# Patient Record
Sex: FEMALE | Race: WHITE | Employment: PART TIME | URBAN - METROPOLITAN AREA
[De-identification: names, ages, dates, MRNs, and addresses within clinical notes are randomized per-mention and may not be internally consistent; named-entity substitution may affect disease eponyms.]

---

## 2017-11-13 ENCOUNTER — OFFICE VISIT (OUTPATIENT)
Dept: INTERNAL MEDICINE CLINIC | Age: 47
End: 2017-11-13

## 2017-11-13 VITALS
WEIGHT: 133.8 LBS | BODY MASS INDEX: 22.84 KG/M2 | HEART RATE: 70 BPM | SYSTOLIC BLOOD PRESSURE: 120 MMHG | TEMPERATURE: 98.6 F | HEIGHT: 64 IN | DIASTOLIC BLOOD PRESSURE: 74 MMHG | RESPIRATION RATE: 12 BRPM

## 2017-11-13 DIAGNOSIS — R00.2 HEART PALPITATIONS: ICD-10-CM

## 2017-11-13 DIAGNOSIS — Z00.00 PREVENTATIVE HEALTH CARE: Primary | ICD-10-CM

## 2017-11-13 DIAGNOSIS — R94.31 SHORTENED PR INTERVAL: ICD-10-CM

## 2017-11-13 NOTE — PROGRESS NOTES
Presents for a PE. She is fasting. In the summer, was sitting down and started blacking out. Three days later with vertigo. Nothing since then. Feels little flutters in her heart over the last month.

## 2017-11-13 NOTE — MR AVS SNAPSHOT
Visit Information Date & Time Provider Department Dept. Phone Encounter #  
 11/13/2017  9:00 AM Alysia Tarango MD [facility] [address] [id number] Upcoming Health Maintenance Date Due  
 PAP AKA CERVICAL CYTOLOGY 12/1/2018 DTaP/Tdap/Td series (2 - Td) 9/21/2026 Allergies as of 11/13/2017  Review Complete On: 11/13/2017 By: Alysia Tarango MD  
  
 Severity Noted Reaction Type Reactions Vicodin [Hydrocodone-acetaminophen]  03/16/2016    Other (comments) Current Immunizations  Reviewed on 11/13/2017 Name Date Influenza Vaccine 9/29/2017, 10/1/2015 Tdap 9/21/2016 Reviewed by Bassam Tuttle on 11/13/2017 at  9:10 AM  
 Reviewed by Alysia Tarango MD on 11/13/2017 at  9:28 AM  
 Reviewed by Alysia Tarango MD on 11/13/2017 at  9:42 AM  
You Were Diagnosed With   
  
 Codes Comments Preventative health care    -  Primary ICD-10-CM: Z00.00 ICD-9-CM: V70.0 Heart palpitations     ICD-10-CM: R00.2 ICD-9-CM: 785.1 Vitals BP Pulse Temp Resp Height(growth percentile) Weight(growth percentile) 120/74 (BP 1 Location: Left arm, BP Patient Position: Sitting) 70 98.6 °F (37 °C) (Oral) 12 5' 3.75\" (1.619 m) 133 lb 12.8 oz (60.7 kg) BMI OB Status Smoking Status 23.15 kg/m2 Having regular periods Never Smoker Vitals History BMI and BSA Data Body Mass Index Body Surface Area  
 23.15 kg/m 2 1.65 m 2 Preferred Pharmacy Pharmacy Name Phone [id number] [place], [address] [place] [address]) [phone] Your Updated Medication List  
  
   
This list is accurate as of: 11/13/17  9:48 AM.  Always use your most recent med list.  
  
  
  
  
 CALCIUM 600 PO Take  by mouth. CRANBERRY PO Take  by mouth. LO LOESTRIN FE 1 mg-10 mcg (24)/10 mcg (2) Tab Generic drug:  norethindrone-e.estradiol-iron  VITAMIN B-12 PO  
 Take  by mouth. We Performed the Following AMB POC EKG ROUTINE W/ 12 LEADS, INTER & REP [33069 CPT(R)] CBC W/O DIFF [93344 CPT(R)] LIPID PANEL [54850 CPT(R)] MAGNESIUM [id number] CPT(R)] METABOLIC PANEL, COMPREHENSIVE [85270 CPT(R)] TSH 3RD GENERATION [10917 CPT(R)] Patient Instructions Well Visit, Ages 25 to 48: Care Instructions Your Care Instructions Physical exams can help you stay healthy. Your doctor has checked your overall health and may have suggested ways to take good care of yourself. He or she also may have recommended tests. At home, you can help prevent illness with healthy eating, regular exercise, and other steps. Follow-up care is a key part of your treatment and safety. Be sure to make and go to all appointments, and call your doctor if you are having problems. It's also a good idea to know your test results and keep a list of the medicines you take. How can you care for yourself at home? · Reach and stay at a healthy weight. This will lower your risk for many problems, such as obesity, diabetes, heart disease, and high blood pressure. · Get at least 30 minutes of physical activity on most days of the week. Walking is a good choice. You also may want to do other activities, such as running, swimming, cycling, or playing tennis or team sports. Discuss any changes in your exercise program with your doctor. · Do not smoke or allow others to smoke around you. If you need help quitting, talk to your doctor about stop-smoking programs and medicines. These can increase your chances of quitting for good. · Talk to your doctor about whether you have any risk factors for sexually transmitted infections (STIs). Having one sex partner (who does not have STIs and does not have sex with anyone else) is a good way to avoid these infections. · Use birth control if you do not want to have children at this time.  Talk with your doctor about the choices available and what might be best for you. · Protect your skin from too much sun. When you're outdoors from 10 a.m. to 4 p.m., stay in the shade or cover up with clothing and a hat with a wide brim. Wear sunglasses that block UV rays. Even when it's cloudy, put broad-spectrum sunscreen (SPF 30 or higher) on any exposed skin. · See a dentist one or two times a year for checkups and to have your teeth cleaned. · Wear a seat belt in the car. · Drink alcohol in moderation, if at all. That means no more than 2 drinks a day for men and 1 drink a day for women. Follow your doctor's advice about when to have certain tests. These tests can spot problems early. For everyone · Cholesterol. Have the fat (cholesterol) in your blood tested after age 21. Your doctor will tell you how often to have this done based on your age, family history, or other things that can increase your risk for heart disease. · Blood pressure. Have your blood pressure checked during a routine doctor visit. Your doctor will tell you how often to check your blood pressure based on your age, your blood pressure results, and other factors. · Vision. Talk with your doctor about how often to have a glaucoma test. 
· Diabetes. Ask your doctor whether you should have tests for diabetes. · Colon cancer. Have a test for colon cancer at age 48. You may have one of several tests. If you are younger than 48, you may need a test earlier if you have any risk factors. Risk factors include whether you already had a precancerous polyp removed from your colon or whether your parent, brother, sister, or child has had colon cancer. For women · Breast exam and mammogram. Talk to your doctor about when you should have a clinical breast exam and a mammogram. Medical experts differ on whether and how often women under 50 should have these tests. Your doctor can help you decide what is right for you. · Pap test and pelvic exam. Begin Pap tests at age 24. A Pap test is the best way to find cervical cancer. The test often is part of a pelvic exam. Ask how often to have this test. 
· Tests for sexually transmitted infections (STIs). Ask whether you should have tests for STIs. You may be at risk if you have sex with more than one person, especially if your partners do not wear condoms. For men · Tests for sexually transmitted infections (STIs). Ask whether you should have tests for STIs. You may be at risk if you have sex with more than one person, especially if you do not wear a condom. · Testicular cancer exam. Ask your doctor whether you should check your testicles regularly. · Prostate exam. Talk to your doctor about whether you should have a blood test (called a PSA test) for prostate cancer. Experts differ on whether and when men should have this test. Some experts suggest it if you are older than 39 and are -American or have a father or brother who got prostate cancer when he was younger than 72. When should you call for help? Watch closely for changes in your health, and be sure to contact your doctor if you have any problems or symptoms that concern you. Where can you learn more? Go to http://rodger-adriana.info/. Enter P072 in the search box to learn more about \"Well Visit, Ages 25 to 48: Care Instructions. \" Current as of: May 12, 2017 Content Version: 11.4 © 6820-4167 [employer]. Care instructions adapted under license by [employer] (which disclaims liability or warranty for this information). If you have questions about a medical condition or this instruction, always ask your healthcare professional. [place] [address] any warranty or liability for your use of this information. Palpitations: Care Instructions Your Care Instructions Heart palpitations are the uncomfortable sensation that your heart is beating fast or irregularly. You might feel pounding or fluttering in your chest. It might feel like your heart is skipping a beat. Although palpitations may be caused by a heart problem, they also occur because of stress, fatigue, or use of alcohol, caffeine, or nicotine. Many medicines, including diet pills, antihistamines, decongestants, and some herbal products, can cause heart palpitations. Nearly everyone has palpitations from time to time. Depending on your symptoms, your doctor may need to do more tests to try to find the cause of your palpitations. Follow-up care is a key part of your treatment and safety. Be sure to make and go to all appointments, and call your doctor if you are having problems. It's also a good idea to know your test results and keep a list of the medicines you take. How can you care for yourself at home? · Avoid caffeine, nicotine, and excess alcohol. · Do not take illegal drugs, such as methamphetamines and cocaine. · Do not take weight loss or diet medicines unless you talk with your doctor first. 
· Get plenty of sleep. · Do not overeat. · If you have palpitations again, take deep breaths and try to relax. This may slow a racing heart. · If you start to feel lightheaded, lie down to avoid injuries that might result if you pass out and fall down. · Keep a record of your palpitations and bring it to your next doctor's appointment. Write down: ¨ The date and time. ¨ Your pulse. (If your heart is beating fast, it may be hard to count your pulse.) ¨ What you were doing when the palpitations started. ¨ How long the palpitations lasted. ¨ Any other symptoms. · If an activity causes palpitations, slow down or stop. Talk to your doctor before you do that activity again. · Take your medicines exactly as prescribed. Call your doctor if you think you are having a problem with your medicine. When should you call for help? Call 911 anytime you think you may need emergency care. For example, call if: 
? · You passed out (lost consciousness). ? · You have symptoms of a heart attack. These may include: ¨ Chest pain or pressure, or a strange feeling in the chest. 
¨ Sweating. ¨ Shortness of breath. ¨ Pain, pressure, or a strange feeling in the back, neck, jaw, or upper belly or in one or both shoulders or arms. ¨ Lightheadedness or sudden weakness. ¨ A fast or irregular heartbeat. After you call 911, the  may tell you to chew 1 adult-strength or 2 to 4 low-dose aspirin. Wait for an ambulance. Do not try to drive yourself. ? · You have symptoms of a stroke. These may include: 
¨ Sudden numbness, tingling, weakness, or loss of movement in your face, arm, or leg, especially on only one side of your body. ¨ Sudden vision changes. ¨ Sudden trouble speaking. ¨ Sudden confusion or trouble understanding simple statements. ¨ Sudden problems with walking or balance. ¨ A sudden, severe headache that is different from past headaches. ?Call your doctor now or seek immediate medical care if: 
? · You have heart palpitations and: ¨ Are dizzy or lightheaded, or you feel like you may faint. ¨ Have new or increased shortness of breath. ? Watch closely for changes in your health, and be sure to contact your doctor if: 
? · You continue to have heart palpitations. Where can you learn more? Go to http://rodger-adriana.info/. Enter R508 in the search box to learn more about \"Palpitations: Care Instructions. \" Current as of: September 21, 2016 Content Version: 11.4 © 9759-4032 [employer]. Care instructions adapted under license by [employer] (which disclaims liability or warranty for this information).  If you have questions about a medical condition or this instruction, always ask your healthcare professional. [employer] disclaims any warranty or liability for your use of this information. Introducing [facility]! Soy Arteaga introduces Naverus patient portal. Now you can access parts of your medical record, email your doctor's office, and request medication refills online. 1. In your internet browser, go to [email or web address] 2. Click on the First Time User? Click Here link in the Sign In box. You will see the New Member Sign Up page. 3. Enter your Naverus Access Code exactly as it appears below. You will not need to use this code after youve completed the sign-up process. If you do not sign up before the expiration date, you must request a new code. · Naverus Access Code: [email or web address] Expires: 2/11/2018  9:48 AM 
 
4. Enter the last four digits of your Social Security Number (xxxx) and Date of Birth (mm/dd/yyyy) as indicated and click Submit. You will be taken to the next sign-up page. 5. Create a Naverus ID. This will be your Naverus login ID and cannot be changed, so think of one that is secure and easy to remember. 6. Create a Naverus password. You can change your password at any time. 7. Enter your Password Reset Question and Answer. This can be used at a later time if you forget your password. 8. Enter your e-mail address. You will receive e-mail notification when new information is available in [address] 9. Click Sign Up. You can now view and download portions of your medical record. 10. Click the Download Summary menu link to download a portable copy of your medical information. If you have questions, please visit the Frequently Asked Questions section of the Naverus website. Remember, Naverus is NOT to be used for urgent needs. For medical emergencies, dial 911. Now available from your iPhone and Android! Please provide this summary of care documentation to your next provider. Your primary care clinician is listed as Yancy Belle. If you have any questions after today's visit, please call [phone].

## 2017-11-13 NOTE — PROGRESS NOTES
Yovani Nicolas is a 41 y.o. female  Presenting for her annual checkup and follow-up  Presents for a PE. She is fasting. In the summer, was sitting down and started blacking out while at the beach. Three days later, she felt vertigo (past hx of this)   This also occurred in . She eats regularly. Feels little flutters in her heart over the last month. Lasts a few seconds and resolves Denies chest pain, SOB, dizziness. Father w hx of heart attack, having an ablation. She walks on treadmill without s/s. Last breast exam: per GYN  Last PAP/pelvic: UTD per GYN. Per Dr. Renee Clark  Last colonoscopy: none  Last DEXA:   Health Maintenance   Topic Date Due    PAP AKA CERVICAL CYTOLOGY  2018    DTaP/Tdap/Td series (2 - Td) 2026    Influenza Age 9 to Adult  Completed       Exercise: moderately active  Diet: generally follows a low fat low cholesterol diet    Vaccinations reviewed  Immunization History   Administered Date(s) Administered    Influenza Vaccine 10/01/2015, 2017    Tdap 2016       Allergies: Vicodin [hydrocodone-acetaminophen]  Current Outpatient Prescriptions   Medication Sig    CALCIUM CARBONATE (CALCIUM 600 PO) Take  by mouth.  CYANOCOBALAMIN, VITAMIN B-12, (VITAMIN B-12 PO) Take  by mouth.  CRANBERRY FRUIT EXTRACT (CRANBERRY PO) Take  by mouth.  LO LOESTRIN FE 1 mg-10 mcg (24)/10 mcg (2) tab      No current facility-administered medications for this visit. has a past medical history of Atypical nevus; Eustachian tube dysfunction; House dust mite allergy (); Nephrolithiasis (); Pap smear for cervical cancer screening (2015); Scoliosis; and Vertigo (2016).   Past Surgical History:   Procedure Laterality Date    HX  SECTION      HX TONSILLECTOMY        Social History     Social History    Marital status:      Spouse name: Alondra Pruitt Number of children: 1+5    Years of education: N/A     Occupational History     [address] [employer]     Social History Main Topics    Smoking status: Never Smoker    Smokeless tobacco: Never Used    Alcohol use Yes      Comment: one drink per week    Drug use: Not on file    Sexual activity: Yes     Other Topics Concern    Not on file     Social History Narrative     Family History   Problem Relation Age of Onset    Cancer Maternal Grandmother 67     gallbladder?  Heart Attack Maternal Grandfather 76    Prostate Cancer Paternal Grandfather     Diabetes Neg Hx     Hypertension Neg Hx     High Cholesterol Neg Hx        Review of Systems - History obtained from the patient  General ROS: negative for - night sweats, weight gain or weight loss  Cardiovascular ROS: no chest pain, dyspnea on exertion, edema  GYN ROS: normal menses, no abnormal bleeding, pelvic pain or discharge, no breast pain or new or enlarging lumps on self exam.    Physical exam  Blood pressure 120/74, pulse 70, temperature 98.6 °F (37 °C), temperature source Oral, resp. rate 12, height 5' 3.75\" (1.619 m), weight 133 lb 12.8 oz (60.7 kg). Wt Readings from Last 3 Encounters:   11/13/17 133 lb 12.8 oz (60.7 kg)   09/21/16 128 lb (58.1 kg)   03/16/16 130 lb (59 kg)     she appears well, alert and oriented x 3, pleasant and cooperative. Vitals as noted. No rashes or significant lesions. Neck supple and free of adenopathy, or masses. No thyromegaly or carotid bruits. Cranial nerves normal. Lungs are clear to auscultation. Heart sounds are normal with no murmurs, clicks, gallops or rubs. Abdomen is soft, non- tender, with no masses or organomegaly. Extremities, peripheral pulses and reflexes are normal.  .   Seeing Dr. HERNANDEZ for GYN    Diagnoses and all orders for this visit:    1. Preventative health care  -     LIPID PANEL  -     METABOLIC PANEL, COMPREHENSIVE  -     CBC W/O DIFF    2. Shortened MT interval  3. Heart palpitations   Intermittent palpitations recently.   History of presyncope 4 months ago without recurrence. EKG today shows short MO interval.  Will check electrolytes and thyroid function. Recommend consultation with electrophysiology if symptoms worsen.    -     TSH 3RD GENERATION  -     MAGNESIUM  -     AMB POC EKG ROUTINE W/ 12 LEADS, INTER & REP    The patient is asked to make an attempt to improve diet and exercise patterns    Return for yearly wellness visits

## 2017-11-13 NOTE — PATIENT INSTRUCTIONS
Well Visit, Ages 25 to 48: Care Instructions  Your Care Instructions    Physical exams can help you stay healthy. Your doctor has checked your overall health and may have suggested ways to take good care of yourself. He or she also may have recommended tests. At home, you can help prevent illness with healthy eating, regular exercise, and other steps. Follow-up care is a key part of your treatment and safety. Be sure to make and go to all appointments, and call your doctor if you are having problems. It's also a good idea to know your test results and keep a list of the medicines you take. How can you care for yourself at home? · Reach and stay at a healthy weight. This will lower your risk for many problems, such as obesity, diabetes, heart disease, and high blood pressure. · Get at least 30 minutes of physical activity on most days of the week. Walking is a good choice. You also may want to do other activities, such as running, swimming, cycling, or playing tennis or team sports. Discuss any changes in your exercise program with your doctor. · Do not smoke or allow others to smoke around you. If you need help quitting, talk to your doctor about stop-smoking programs and medicines. These can increase your chances of quitting for good. · Talk to your doctor about whether you have any risk factors for sexually transmitted infections (STIs). Having one sex partner (who does not have STIs and does not have sex with anyone else) is a good way to avoid these infections. · Use birth control if you do not want to have children at this time. Talk with your doctor about the choices available and what might be best for you. · Protect your skin from too much sun. When you're outdoors from 10 a.m. to 4 p.m., stay in the shade or cover up with clothing and a hat with a wide brim. Wear sunglasses that block UV rays. Even when it's cloudy, put broad-spectrum sunscreen (SPF 30 or higher) on any exposed skin.   · See a dentist one or two times a year for checkups and to have your teeth cleaned. · Wear a seat belt in the car. · Drink alcohol in moderation, if at all. That means no more than 2 drinks a day for men and 1 drink a day for women. Follow your doctor's advice about when to have certain tests. These tests can spot problems early. For everyone  · Cholesterol. Have the fat (cholesterol) in your blood tested after age 21. Your doctor will tell you how often to have this done based on your age, family history, or other things that can increase your risk for heart disease. · Blood pressure. Have your blood pressure checked during a routine doctor visit. Your doctor will tell you how often to check your blood pressure based on your age, your blood pressure results, and other factors. · Vision. Talk with your doctor about how often to have a glaucoma test.  · Diabetes. Ask your doctor whether you should have tests for diabetes. · Colon cancer. Have a test for colon cancer at age 48. You may have one of several tests. If you are younger than 48, you may need a test earlier if you have any risk factors. Risk factors include whether you already had a precancerous polyp removed from your colon or whether your parent, brother, sister, or child has had colon cancer. For women  · Breast exam and mammogram. Talk to your doctor about when you should have a clinical breast exam and a mammogram. Medical experts differ on whether and how often women under 50 should have these tests. Your doctor can help you decide what is right for you. · Pap test and pelvic exam. Begin Pap tests at age 24. A Pap test is the best way to find cervical cancer. The test often is part of a pelvic exam. Ask how often to have this test.  · Tests for sexually transmitted infections (STIs). Ask whether you should have tests for STIs. You may be at risk if you have sex with more than one person, especially if your partners do not wear condoms.   For men  · Tests for sexually transmitted infections (STIs). Ask whether you should have tests for STIs. You may be at risk if you have sex with more than one person, especially if you do not wear a condom. · Testicular cancer exam. Ask your doctor whether you should check your testicles regularly. · Prostate exam. Talk to your doctor about whether you should have a blood test (called a PSA test) for prostate cancer. Experts differ on whether and when men should have this test. Some experts suggest it if you are older than 39 and are -American or have a father or brother who got prostate cancer when he was younger than 72. When should you call for help? Watch closely for changes in your health, and be sure to contact your doctor if you have any problems or symptoms that concern you. Where can you learn more? Go to http://rodger-adriana.info/. Enter P072 in the search box to learn more about \"Well Visit, Ages 25 to 48: Care Instructions. \"  Current as of: May 12, 2017  Content Version: 11.4  © 0821-6536 [employer]. Care instructions adapted under license by [employer] (which disclaims liability or warranty for this information). If you have questions about a medical condition or this instruction, always ask your healthcare professional. [place] [address] any warranty or liability for your use of this information. Palpitations: Care Instructions  Your Care Instructions    Heart palpitations are the uncomfortable sensation that your heart is beating fast or irregularly. You might feel pounding or fluttering in your chest. It might feel like your heart is skipping a beat. Although palpitations may be caused by a heart problem, they also occur because of stress, fatigue, or use of alcohol, caffeine, or nicotine. Many medicines, including diet pills, antihistamines, decongestants, and some herbal products, can cause heart palpitations.  Nearly everyone has palpitations from time to time. Depending on your symptoms, your doctor may need to do more tests to try to find the cause of your palpitations. Follow-up care is a key part of your treatment and safety. Be sure to make and go to all appointments, and call your doctor if you are having problems. It's also a good idea to know your test results and keep a list of the medicines you take. How can you care for yourself at home? · Avoid caffeine, nicotine, and excess alcohol. · Do not take illegal drugs, such as methamphetamines and cocaine. · Do not take weight loss or diet medicines unless you talk with your doctor first.  · Get plenty of sleep. · Do not overeat. · If you have palpitations again, take deep breaths and try to relax. This may slow a racing heart. · If you start to feel lightheaded, lie down to avoid injuries that might result if you pass out and fall down. · Keep a record of your palpitations and bring it to your next doctor's appointment. Write down:  ¨ The date and time. ¨ Your pulse. (If your heart is beating fast, it may be hard to count your pulse.)  ¨ What you were doing when the palpitations started. ¨ How long the palpitations lasted. ¨ Any other symptoms. · If an activity causes palpitations, slow down or stop. Talk to your doctor before you do that activity again. · Take your medicines exactly as prescribed. Call your doctor if you think you are having a problem with your medicine. When should you call for help? Call 911 anytime you think you may need emergency care. For example, call if:  ? · You passed out (lost consciousness). ? · You have symptoms of a heart attack. These may include:  ¨ Chest pain or pressure, or a strange feeling in the chest.  ¨ Sweating. ¨ Shortness of breath. ¨ Pain, pressure, or a strange feeling in the back, neck, jaw, or upper belly or in one or both shoulders or arms. ¨ Lightheadedness or sudden weakness.   ¨ A fast or irregular heartbeat. After you call 911, the  may tell you to chew 1 adult-strength or 2 to 4 low-dose aspirin. Wait for an ambulance. Do not try to drive yourself. ? · You have symptoms of a stroke. These may include:  ¨ Sudden numbness, tingling, weakness, or loss of movement in your face, arm, or leg, especially on only one side of your body. ¨ Sudden vision changes. ¨ Sudden trouble speaking. ¨ Sudden confusion or trouble understanding simple statements. ¨ Sudden problems with walking or balance. ¨ A sudden, severe headache that is different from past headaches. ?Call your doctor now or seek immediate medical care if:  ? · You have heart palpitations and:  ¨ Are dizzy or lightheaded, or you feel like you may faint. ¨ Have new or increased shortness of breath. ? Watch closely for changes in your health, and be sure to contact your doctor if:  ? · You continue to have heart palpitations. Where can you learn more? Go to http://rodger-adriana.info/. Enter R508 in the search box to learn more about \"Palpitations: Care Instructions. \"  Current as of: September 21, 2016  Content Version: 11.4  © 5647-8945 [employer]. Care instructions adapted under license by [employer] (which disclaims liability or warranty for this information). If you have questions about a medical condition or this instruction, always ask your healthcare professional. [place] [address] any warranty or liability for your use of this information.

## 2017-11-14 LAB
ALBUMIN SERPL-MCNC: 4.6 G/DL (ref 3.5–5.5)
ALBUMIN/GLOB SERPL: 1.6 {RATIO} (ref 1.2–2.2)
ALP SERPL-CCNC: 68 IU/L (ref 39–117)
ALT SERPL-CCNC: 12 IU/L (ref 0–32)
AST SERPL-CCNC: 16 IU/L (ref 0–40)
BILIRUB SERPL-MCNC: 0.4 MG/DL (ref 0–1.2)
BUN SERPL-MCNC: 14 MG/DL (ref 6–24)
BUN/CREAT SERPL: 16 (ref 9–23)
CALCIUM SERPL-MCNC: 9.8 MG/DL (ref 8.7–10.2)
CHLORIDE SERPL-SCNC: 101 MMOL/L (ref 96–106)
CHOLEST SERPL-MCNC: 226 MG/DL (ref 100–199)
CO2 SERPL-SCNC: 22 MMOL/L (ref 18–29)
CREAT SERPL-MCNC: 0.85 MG/DL (ref 0.57–1)
ERYTHROCYTE [DISTWIDTH] IN BLOOD BY AUTOMATED COUNT: 12.9 % (ref 12.3–15.4)
GFR SERPLBLD CREATININE-BSD FMLA CKD-EPI: 83 ML/MIN/1.73
GFR SERPLBLD CREATININE-BSD FMLA CKD-EPI: 96 ML/MIN/1.73
GLOBULIN SER CALC-MCNC: 2.8 G/DL (ref 1.5–4.5)
GLUCOSE SERPL-MCNC: 89 MG/DL (ref 65–99)
HCT VFR BLD AUTO: 42.1 % (ref 34–46.6)
HDLC SERPL-MCNC: 65 MG/DL
HGB BLD-MCNC: 14.1 G/DL (ref 11.1–15.9)
INTERPRETATION, 910389: NORMAL
LDLC SERPL CALC-MCNC: 136 MG/DL (ref 0–99)
MAGNESIUM SERPL-MCNC: 2.2 MG/DL (ref 1.6–2.3)
MCH RBC QN AUTO: 31.5 PG (ref 26.6–33)
MCHC RBC AUTO-ENTMCNC: 33.5 G/DL (ref 31.5–35.7)
MCV RBC AUTO: 94 FL (ref 79–97)
PLATELET # BLD AUTO: 224 X10E3/UL (ref 150–379)
POTASSIUM SERPL-SCNC: 4.4 MMOL/L (ref 3.5–5.2)
PROT SERPL-MCNC: 7.4 G/DL (ref 6–8.5)
RBC # BLD AUTO: 4.47 X10E6/UL (ref 3.77–5.28)
SODIUM SERPL-SCNC: 141 MMOL/L (ref 134–144)
TRIGL SERPL-MCNC: 126 MG/DL (ref 0–149)
TSH SERPL DL<=0.005 MIU/L-ACNC: 3.83 UIU/ML (ref 0.45–4.5)
VLDLC SERPL CALC-MCNC: 25 MG/DL (ref 5–40)
WBC # BLD AUTO: 9.1 X10E3/UL (ref 3.4–10.8)

## 2018-10-02 ENCOUNTER — HOSPITAL ENCOUNTER (OUTPATIENT)
Dept: MAMMOGRAPHY | Age: 48
Discharge: HOME | End: 2018-10-02
Attending: OBSTETRICS & GYNECOLOGY
Payer: COMMERCIAL

## 2018-10-02 DIAGNOSIS — Z12.31 VISIT FOR SCREENING MAMMOGRAM: ICD-10-CM

## 2018-10-02 PROCEDURE — 77067 SCR MAMMO BI INCL CAD: CPT

## 2018-10-04 ENCOUNTER — HOSPITAL ENCOUNTER (OUTPATIENT)
Dept: MAMMOGRAPHY | Age: 48
Discharge: HOME | End: 2018-10-04
Attending: OBSTETRICS & GYNECOLOGY
Payer: COMMERCIAL

## 2018-10-04 DIAGNOSIS — R92.8 ABNORMAL MAMMOGRAM: ICD-10-CM

## 2018-10-04 PROCEDURE — 77065 DX MAMMO INCL CAD UNI: CPT

## 2018-12-24 ENCOUNTER — HOSPITAL ENCOUNTER (OUTPATIENT)
Dept: CT IMAGING | Age: 48
Discharge: HOME | End: 2018-12-24
Attending: SPECIALIST
Payer: COMMERCIAL

## 2018-12-24 DIAGNOSIS — H68.123 INTRINSIC CARTILAGENOUS OBSTRUCTION OF EUSTACHIAN TUBE, BILATERAL: ICD-10-CM

## 2018-12-24 PROCEDURE — 70480 CT ORBIT/EAR/FOSSA W/O DYE: CPT

## 2019-01-25 ENCOUNTER — OFFICE VISIT (OUTPATIENT)
Dept: INTERNAL MEDICINE CLINIC | Age: 49
End: 2019-01-25

## 2019-01-25 VITALS
SYSTOLIC BLOOD PRESSURE: 112 MMHG | TEMPERATURE: 97.9 F | WEIGHT: 133 LBS | OXYGEN SATURATION: 98 % | HEART RATE: 71 BPM | HEIGHT: 64 IN | BODY MASS INDEX: 22.71 KG/M2 | RESPIRATION RATE: 16 BRPM | DIASTOLIC BLOOD PRESSURE: 73 MMHG

## 2019-01-25 DIAGNOSIS — Z00.00 PREVENTATIVE HEALTH CARE: Primary | ICD-10-CM

## 2019-01-25 NOTE — PROGRESS NOTES
Patient comes in for her complete physical exam today and she stated she goes to see Dr. Julian Hicks for her gyn exams.

## 2019-01-26 LAB
ALBUMIN SERPL-MCNC: 4.6 G/DL (ref 3.5–5.5)
ALBUMIN/GLOB SERPL: 1.8 {RATIO} (ref 1.2–2.2)
ALP SERPL-CCNC: 57 IU/L (ref 39–117)
ALT SERPL-CCNC: 12 IU/L (ref 0–32)
AST SERPL-CCNC: 21 IU/L (ref 0–40)
BILIRUB SERPL-MCNC: 0.3 MG/DL (ref 0–1.2)
BUN SERPL-MCNC: 18 MG/DL (ref 6–24)
BUN/CREAT SERPL: 20 (ref 9–23)
CALCIUM SERPL-MCNC: 9.5 MG/DL (ref 8.7–10.2)
CHLORIDE SERPL-SCNC: 102 MMOL/L (ref 96–106)
CHOLEST SERPL-MCNC: 228 MG/DL (ref 100–199)
CO2 SERPL-SCNC: 24 MMOL/L (ref 20–29)
CREAT SERPL-MCNC: 0.91 MG/DL (ref 0.57–1)
ERYTHROCYTE [DISTWIDTH] IN BLOOD BY AUTOMATED COUNT: 13.2 % (ref 12.3–15.4)
GLOBULIN SER CALC-MCNC: 2.5 G/DL (ref 1.5–4.5)
GLUCOSE SERPL-MCNC: 95 MG/DL (ref 65–99)
HCT VFR BLD AUTO: 40.8 % (ref 34–46.6)
HDLC SERPL-MCNC: 65 MG/DL
HGB BLD-MCNC: 13.9 G/DL (ref 11.1–15.9)
INTERPRETATION, 910389: NORMAL
LDLC SERPL CALC-MCNC: 131 MG/DL (ref 0–99)
MCH RBC QN AUTO: 32 PG (ref 26.6–33)
MCHC RBC AUTO-ENTMCNC: 34.1 G/DL (ref 31.5–35.7)
MCV RBC AUTO: 94 FL (ref 79–97)
PLATELET # BLD AUTO: 233 X10E3/UL (ref 150–379)
POTASSIUM SERPL-SCNC: 4.3 MMOL/L (ref 3.5–5.2)
PROT SERPL-MCNC: 7.1 G/DL (ref 6–8.5)
RBC # BLD AUTO: 4.35 X10E6/UL (ref 3.77–5.28)
SODIUM SERPL-SCNC: 142 MMOL/L (ref 134–144)
TRIGL SERPL-MCNC: 162 MG/DL (ref 0–149)
VLDLC SERPL CALC-MCNC: 32 MG/DL (ref 5–40)
WBC # BLD AUTO: 7.8 X10E3/UL (ref 3.4–10.8)

## 2019-01-26 NOTE — PROGRESS NOTES
Renny Watters is a 57 y.o. female Presenting for her annual checkup and follow-up Presents for a PE. She is fasting. Last breast exam: per GYN Last PAP/pelvic: UTD per GYN. Per Dr. Velazquez 
Last colonoscopy: none Last DEXA:  
Health Maintenance Topic Date Due  
 PAP AKA CERVICAL CYTOLOGY  2021  
 DTaP/Tdap/Td series (2 - Td) 2026  Influenza Age 5 to Adult  Completed Exercise: moderately active Diet: generally follows a low fat low cholesterol diet Vaccinations reviewed Immunization History Administered Date(s) Administered  Influenza Vaccine 10/01/2015, 2017, 10/04/2018  Tdap 2016 Allergies: Vicodin [hydrocodone-acetaminophen] Current Outpatient Medications Medication Sig  LO LOESTRIN FE 1 mg-10 mcg (24)/10 mcg (2) tab  CALCIUM CARBONATE (CALCIUM 600 PO) Take  by mouth.  CYANOCOBALAMIN, VITAMIN B-12, (VITAMIN B-12 PO) Take  by mouth.  CRANBERRY FRUIT EXTRACT (CRANBERRY PO) Take  by mouth. No current facility-administered medications for this visit. has a past medical history of Atypical nevus, Eustachian tube dysfunction, Heart palpitations (2017), House dust mite allergy (), Nephrolithiasis (), Pap smear for cervical cancer screening (2015), Scoliosis, Shortened HI interval (2017), and Vertigo (2016). Past Surgical History:  
Procedure Laterality Date  HX  SECTION   [address] [place] [address] Social History Socioeconomic History  Marital status:  Spouse name: Tiffany Dutton  Number of children: 1+5  Years of education: Not on file  Highest education level: Not on file Social Needs  Financial resource strain: Not on file  Food insecurity - worry: Not on file  Food insecurity - inability: Not on file  Transportation needs - medical: Not on file  Transportation needs - non-medical: Not on file Occupational History  Occupation:  [employer] Employer: [employer] Tobacco Use  Smoking status: Never Smoker  Smokeless tobacco: Never Used Substance and Sexual Activity  Alcohol use: Yes Comment: one drink per week  Drug use: Not on file  Sexual activity: Yes Other Topics Concern  Not on file Social History Narrative  Not on file Family History Problem Relation Age of Onset  Cancer Maternal Grandmother 67  
     gallbladder?  Heart Attack Maternal Grandfather 76  
 Prostate Cancer Paternal Grandfather  Coronary Artery Disease Father 67  Arrhythmia Father   
     ablation  Diabetes Neg Hx  Hypertension Neg Hx  High Cholesterol Neg Hx Review of Systems - History obtained from the patient General ROS: negative for - night sweats, weight gain or weight loss Cardiovascular ROS: no chest pain, dyspnea on exertion, edema GYN ROS: normal menses, no abnormal bleeding, pelvic pain or discharge, no breast pain or new or enlarging lumps on self exam. 
 
Physical exam 
Blood pressure 112/73, pulse 71, temperature 97.9 °F (36.6 °C), temperature source Oral, resp. rate 16, height 5' 3.75\" (1.619 m), weight 133 lb (60.3 kg), SpO2 98 %. Wt Readings from Last 3 Encounters:  
01/25/19 133 lb (60.3 kg)  
11/13/17 133 lb 12.8 oz (60.7 kg) 09/21/16 128 lb (58.1 kg) she appears well, alert and oriented x 3, pleasant and cooperative. Vitals as noted. No rashes or significant lesions. Neck supple and free of adenopathy, or masses. No thyromegaly or carotid bruits. Cranial nerves normal. Lungs are clear to auscultation. Heart sounds are normal with no murmurs, clicks, gallops or rubs. Abdomen is soft, non- tender, with no masses or organomegaly. Extremities, peripheral pulses and reflexes are normal.  .  
Seeing Dr. CARR [facility] for GYN Diagnoses and all orders for this visit: 1. Preventative health care -     CBC W/O DIFF 
 -     METABOLIC PANEL, COMPREHENSIVE 
-     LIPID PANEL The patient is asked to make an attempt to improve diet and exercise patterns Return for yearly wellness visits

## 2019-10-08 ENCOUNTER — HOSPITAL ENCOUNTER (OUTPATIENT)
Dept: MAMMOGRAPHY | Age: 49
Discharge: HOME | End: 2019-10-08
Attending: OBSTETRICS & GYNECOLOGY
Payer: COMMERCIAL

## 2019-10-08 DIAGNOSIS — Z12.39 BREAST SCREENING: ICD-10-CM

## 2019-10-08 PROCEDURE — 77067 SCR MAMMO BI INCL CAD: CPT

## 2020-01-29 ENCOUNTER — HOSPITAL ENCOUNTER (OUTPATIENT)
Dept: LAB | Age: 50
Discharge: HOME | End: 2020-01-29

## 2020-01-29 ENCOUNTER — OFFICE VISIT (OUTPATIENT)
Dept: INTERNAL MEDICINE CLINIC | Age: 50
End: 2020-01-29

## 2020-01-29 VITALS
OXYGEN SATURATION: 98 % | TEMPERATURE: 98.6 F | DIASTOLIC BLOOD PRESSURE: 72 MMHG | HEIGHT: 64 IN | BODY MASS INDEX: 22.94 KG/M2 | SYSTOLIC BLOOD PRESSURE: 111 MMHG | RESPIRATION RATE: 12 BRPM | WEIGHT: 134.4 LBS | HEART RATE: 67 BPM

## 2020-01-29 DIAGNOSIS — Z00.00 PREVENTATIVE HEALTH CARE: ICD-10-CM

## 2020-01-29 DIAGNOSIS — R00.2 HEART PALPITATIONS: ICD-10-CM

## 2020-01-29 DIAGNOSIS — R94.31 SHORTENED PR INTERVAL: ICD-10-CM

## 2020-01-29 DIAGNOSIS — Z00.00 PREVENTATIVE HEALTH CARE: Primary | ICD-10-CM

## 2020-01-29 DIAGNOSIS — R20.2 PARESTHESIA OF RIGHT FOOT: ICD-10-CM

## 2020-01-29 LAB
ALBUMIN SERPL-MCNC: 4 G/DL (ref 3.5–5)
ALBUMIN/GLOB SERPL: 1.3 {RATIO} (ref 1.1–2.2)
ALP SERPL-CCNC: 71 U/L (ref 45–117)
ALT SERPL-CCNC: 29 U/L (ref 12–78)
ANION GAP SERPL CALC-SCNC: 6 MMOL/L (ref 5–15)
AST SERPL-CCNC: 21 U/L (ref 15–37)
BASOPHILS # BLD: 0.1 K/UL (ref 0–0.1)
BASOPHILS NFR BLD: 1 % (ref 0–1)
BILIRUB SERPL-MCNC: 0.5 MG/DL (ref 0.2–1)
BUN SERPL-MCNC: 21 MG/DL (ref 6–20)
BUN/CREAT SERPL: 26 (ref 12–20)
CALCIUM SERPL-MCNC: 9.1 MG/DL (ref 8.5–10.1)
CHLORIDE SERPL-SCNC: 108 MMOL/L (ref 97–108)
CHOLEST SERPL-MCNC: 213 MG/DL
CO2 SERPL-SCNC: 27 MMOL/L (ref 21–32)
CREAT SERPL-MCNC: 0.8 MG/DL (ref 0.55–1.02)
DIFFERENTIAL METHOD BLD: NORMAL
EOSINOPHIL # BLD: 0.2 K/UL (ref 0–0.4)
EOSINOPHIL NFR BLD: 2 % (ref 0–7)
ERYTHROCYTE [DISTWIDTH] IN BLOOD BY AUTOMATED COUNT: 12.1 % (ref 11.5–14.5)
GLOBULIN SER CALC-MCNC: 3 G/DL (ref 2–4)
GLUCOSE SERPL-MCNC: 87 MG/DL (ref 65–100)
HCT VFR BLD AUTO: 40.8 % (ref 35–47)
HDLC SERPL-MCNC: 69 MG/DL
HDLC SERPL: 3.1 {RATIO} (ref 0–5)
HGB BLD-MCNC: 13.2 G/DL (ref 11.5–16)
IMM GRANULOCYTES # BLD AUTO: 0 K/UL (ref 0–0.04)
IMM GRANULOCYTES NFR BLD AUTO: 0 % (ref 0–0.5)
LDLC SERPL CALC-MCNC: 125.4 MG/DL (ref 0–100)
LIPID PROFILE,FLP: ABNORMAL
LYMPHOCYTES # BLD: 2.6 K/UL (ref 0.8–3.5)
LYMPHOCYTES NFR BLD: 31 % (ref 12–49)
MCH RBC QN AUTO: 31.4 PG (ref 26–34)
MCHC RBC AUTO-ENTMCNC: 32.4 G/DL (ref 30–36.5)
MCV RBC AUTO: 97.1 FL (ref 80–99)
MONOCYTES # BLD: 0.7 K/UL (ref 0–1)
MONOCYTES NFR BLD: 8 % (ref 5–13)
NEUTS SEG # BLD: 4.8 K/UL (ref 1.8–8)
NEUTS SEG NFR BLD: 58 % (ref 32–75)
NRBC # BLD: 0 K/UL (ref 0–0.01)
NRBC BLD-RTO: 0 PER 100 WBC
PLATELET # BLD AUTO: 182 K/UL (ref 150–400)
PMV BLD AUTO: 12 FL (ref 8.9–12.9)
POTASSIUM SERPL-SCNC: 4.1 MMOL/L (ref 3.5–5.1)
PROT SERPL-MCNC: 7 G/DL (ref 6.4–8.2)
RBC # BLD AUTO: 4.2 M/UL (ref 3.8–5.2)
SODIUM SERPL-SCNC: 141 MMOL/L (ref 136–145)
TRIGL SERPL-MCNC: 93 MG/DL (ref ?–150)
TSH SERPL DL<=0.05 MIU/L-ACNC: 1.84 UIU/ML (ref 0.36–3.74)
VLDLC SERPL CALC-MCNC: 18.6 MG/DL
WBC # BLD AUTO: 8.4 K/UL (ref 3.6–11)

## 2020-01-29 NOTE — PROGRESS NOTES
Marita De is a 54 y.o. female  Presenting for her annual checkup and follow-up    Seeing Dr. CARR [facility] for GYN. Stopped OCP in Oct, hoping to improve libido. Sleeping well. 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 Back pains. Saw chiro Dr. Bennie Gutierrez. He could not crack her back. She is seeing him again. Pain is mild. R toe sensation for 1 month. Numbness of pad of foot. No injury. No hx of DM. Ear popping. Saw Dr. Gypsy Reddy last year. Considering eustachian tube implant? Using eustachi    Reports one episodes of palpitations in the kitchen in December for a few second. Felt like she might pass out. No recurrence. Occurred 1 hour after awakening. Father w hx of heart attack, had afib ablation. EKG in  showed a short MN interval.  No other previous cardiac work-up. Health Maintenance   Topic Date Due    PAP AKA CERVICAL CYTOLOGY  2021    DTaP/Tdap/Td series (2 - Td) 2026    Influenza Age 5 to Adult  Completed    Pneumococcal 0-64 years  Aged Out       Exercise: very active  Diet: generally follows a low fat low cholesterol diet    Vaccinations reviewed  Immunization History   Administered Date(s) Administered    Influenza Vaccine 10/01/2015, 2017, 10/04/2018, 10/01/2019    Tdap 2016       Allergies: Vicodin [hydrocodone-acetaminophen]  Current Outpatient Medications   Medication Sig    Multivitamins with Fluoride (MULTI-VITAMIN PO) Take  by mouth. No current facility-administered medications for this visit. has a past medical history of Atypical nevus, Eustachian tube dysfunction, Heart palpitations (2017), House dust mite allergy (), Nephrolithiasis (), Pap smear for cervical cancer screening (2015), Scoliosis, Shortened MN interval (2017), and Vertigo (2016).   Past Surgical History:   Procedure Laterality Date    HX  SECTION      HX TONSILLECTOMY        Social History Socioeconomic History    Marital status:      Spouse name: Adelso Xie Number of children: 1+5    Years of education: Not on file    Highest education level: Not on file   Occupational History    Occupation:  [employer]     Employer: [address] Financial resource strain: Not on file    Food insecurity:     Worry: Not on file     Inability: Not on file   Ping Communication needs:     Medical: Not on file     Non-medical: Not on file   Tobacco Use    Smoking status: Never Smoker    Smokeless tobacco: Never Used   Substance and Sexual Activity    Alcohol use: Yes     Comment: one drink per week    Drug use: Not on file    Sexual activity: Yes   Lifestyle    Physical activity:     Days per week: Not on file     Minutes per session: Not on file    Stress: Not on file   Relationships    Social connections:     Talks on phone: Not on file     Gets together: Not on file     Attends Gnosticism service: Not on file     Active member of club or organization: Not on file     Attends meetings of clubs or organizations: Not on file     Relationship status: Not on file    Intimate partner violence:     Fear of current or ex partner: Not on file     Emotionally abused: Not on file     Physically abused: Not on file     Forced sexual activity: Not on file   Other Topics Concern    Not on file   Social History Narrative    Not on file     Family History   Problem Relation Age of Onset    Cancer Maternal Grandmother 67        gallbladder?     Heart Attack Maternal Grandfather 76    Prostate Cancer Paternal Grandfather     Coronary Artery Disease Father 67    Arrhythmia Father         ablation    Diabetes Neg Hx     Hypertension Neg Hx     High Cholesterol Neg Hx        Review of Systems - History obtained from the patient  General ROS: negative for - night sweats, weight gain or weight loss  Cardiovascular ROS: no chest pain, dyspnea on exertion, edema  GYN ROS: normal menses, no abnormal bleeding, pelvic pain or discharge, no breast pain or new or enlarging lumps on self exam.    Physical exam  Blood pressure 111/72, pulse 67, temperature 98.6 °F (37 °C), temperature source Oral, resp. rate 12, height 5' 3.75\" (1.619 m), weight 134 lb 6.4 oz (61 kg), last menstrual period 01/15/2020, SpO2 98 %. Wt Readings from Last 3 Encounters:   01/29/20 134 lb 6.4 oz (61 kg)   01/25/19 133 lb (60.3 kg)   11/13/17 133 lb 12.8 oz (60.7 kg)     she appears well, alert and oriented x 3, pleasant and cooperative. Vitals as noted. No rashes or significant lesions. Neck supple and free of adenopathy, or masses. No thyromegaly or carotid bruits. Cranial nerves normal. Lungs are clear to auscultation. Heart sounds are normal with no murmurs, clicks, gallops or rubs. Abdomen is soft, non- tender, with no masses or organomegaly. Extremities, peripheral pulses and reflexes are normal.  . BREAST EXAM: none  PELVIC EXAM: examination not indicated      Diagnoses and all orders for this visit:    1. Preventative health care  Healthy 55 yo  -     LIPID PANEL; Future  -     METABOLIC PANEL, COMPREHENSIVE; Future  -     CBC WITH AUTOMATED DIFF; Future  -     TSH 3RD GENERATION; Future    2. Paresthesia of right foot  Suspect this is a local nerve issue such as tarsal tunnel metatarsalgia? Metatarsal pads. Monitor. Can refer if worsening. 3. Shortened MO interval  4. Heart palpitations   Isolated episode of palpitations 1 month ago. Has not recurred. He does have a distant history of palpitations. No alarm symptoms at this point and heart sounds regular on my exam.  Will monitor for now. Can refer to cardiology at any time consider Holter monitor as well.         The patient is asked to make an attempt to improve diet and exercise patterns    Return for yearly wellness visits

## 2020-03-04 ENCOUNTER — HOSPITAL ENCOUNTER (OUTPATIENT)
Age: 50
Setting detail: OBSERVATION
Discharge: HOME | End: 2020-03-05
Attending: EMERGENCY MEDICINE | Admitting: SURGERY
Payer: COMMERCIAL

## 2020-03-04 ENCOUNTER — APPOINTMENT (OUTPATIENT)
Dept: CT IMAGING | Age: 50
End: 2020-03-04
Attending: EMERGENCY MEDICINE
Payer: COMMERCIAL

## 2020-03-04 DIAGNOSIS — K35.80 ACUTE APPENDICITIS, UNSPECIFIED ACUTE APPENDICITIS TYPE: Primary | ICD-10-CM

## 2020-03-04 LAB
ALBUMIN SERPL-MCNC: 4 G/DL (ref 3.5–5)
ALBUMIN/GLOB SERPL: 1.1 {RATIO} (ref 1.1–2.2)
ALP SERPL-CCNC: 74 U/L (ref 45–117)
ALT SERPL-CCNC: 19 U/L (ref 12–78)
ANION GAP SERPL CALC-SCNC: 9 MMOL/L (ref 5–15)
APPEARANCE UR: CLEAR
AST SERPL-CCNC: 15 U/L (ref 15–37)
BACTERIA URNS QL MICRO: NEGATIVE /HPF
BASOPHILS # BLD: 0.1 K/UL (ref 0–0.1)
BASOPHILS NFR BLD: 0 % (ref 0–1)
BILIRUB SERPL-MCNC: 0.8 MG/DL (ref 0.2–1)
BILIRUB UR QL: NEGATIVE
BUN SERPL-MCNC: 16 MG/DL (ref 6–20)
BUN/CREAT SERPL: 19 (ref 12–20)
CALCIUM SERPL-MCNC: 9.2 MG/DL (ref 8.5–10.1)
CHLORIDE SERPL-SCNC: 107 MMOL/L (ref 97–108)
CO2 SERPL-SCNC: 23 MMOL/L (ref 21–32)
COLOR UR: ABNORMAL
CREAT SERPL-MCNC: 0.84 MG/DL (ref 0.55–1.02)
DIFFERENTIAL METHOD BLD: ABNORMAL
EOSINOPHIL # BLD: 0.1 K/UL (ref 0–0.4)
EOSINOPHIL NFR BLD: 1 % (ref 0–7)
EPITH CASTS URNS QL MICRO: ABNORMAL /LPF
ERYTHROCYTE [DISTWIDTH] IN BLOOD BY AUTOMATED COUNT: 12.1 % (ref 11.5–14.5)
GLOBULIN SER CALC-MCNC: 3.5 G/DL (ref 2–4)
GLUCOSE SERPL-MCNC: 96 MG/DL (ref 65–100)
GLUCOSE UR STRIP.AUTO-MCNC: NEGATIVE MG/DL
HCT VFR BLD AUTO: 39.9 % (ref 35–47)
HGB BLD-MCNC: 13.2 G/DL (ref 11.5–16)
HGB UR QL STRIP: ABNORMAL
HYALINE CASTS URNS QL MICRO: ABNORMAL /LPF (ref 0–5)
IMM GRANULOCYTES # BLD AUTO: 0.1 K/UL (ref 0–0.04)
IMM GRANULOCYTES NFR BLD AUTO: 0 % (ref 0–0.5)
KETONES UR QL STRIP.AUTO: 80 MG/DL
LEUKOCYTE ESTERASE UR QL STRIP.AUTO: ABNORMAL
LIPASE SERPL-CCNC: 154 U/L (ref 73–393)
LYMPHOCYTES # BLD: 2.2 K/UL (ref 0.8–3.5)
LYMPHOCYTES NFR BLD: 12 % (ref 12–49)
MCH RBC QN AUTO: 31.1 PG (ref 26–34)
MCHC RBC AUTO-ENTMCNC: 33.1 G/DL (ref 30–36.5)
MCV RBC AUTO: 93.9 FL (ref 80–99)
MONOCYTES # BLD: 1.1 K/UL (ref 0–1)
MONOCYTES NFR BLD: 6 % (ref 5–13)
NEUTS SEG # BLD: 14.8 K/UL (ref 1.8–8)
NEUTS SEG NFR BLD: 81 % (ref 32–75)
NITRITE UR QL STRIP.AUTO: NEGATIVE
NRBC # BLD: 0 K/UL (ref 0–0.01)
NRBC BLD-RTO: 0 PER 100 WBC
PH UR STRIP: 5 [PH] (ref 5–8)
PLATELET # BLD AUTO: 191 K/UL (ref 150–400)
PMV BLD AUTO: 11.8 FL (ref 8.9–12.9)
POTASSIUM SERPL-SCNC: 3.7 MMOL/L (ref 3.5–5.1)
PROT SERPL-MCNC: 7.5 G/DL (ref 6.4–8.2)
PROT UR STRIP-MCNC: NEGATIVE MG/DL
RBC # BLD AUTO: 4.25 M/UL (ref 3.8–5.2)
RBC #/AREA URNS HPF: ABNORMAL /HPF (ref 0–5)
SODIUM SERPL-SCNC: 139 MMOL/L (ref 136–145)
SP GR UR REFRACTOMETRY: 1.01 (ref 1–1.03)
UA: UC IF INDICATED,UAUC: ABNORMAL
UROBILINOGEN UR QL STRIP.AUTO: 0.2 EU/DL (ref 0.2–1)
WBC # BLD AUTO: 18.4 K/UL (ref 3.6–11)
WBC URNS QL MICRO: ABNORMAL /HPF (ref 0–4)

## 2020-03-04 PROCEDURE — 36415 COLL VENOUS BLD VENIPUNCTURE: CPT

## 2020-03-04 PROCEDURE — 87086 URINE CULTURE/COLONY COUNT: CPT

## 2020-03-04 PROCEDURE — 85025 COMPLETE CBC W/AUTO DIFF WBC: CPT

## 2020-03-04 PROCEDURE — 81001 URINALYSIS AUTO W/SCOPE: CPT

## 2020-03-04 PROCEDURE — 96374 THER/PROPH/DIAG INJ IV PUSH: CPT

## 2020-03-04 PROCEDURE — 74011636320 HC RX REV CODE- 636/320: Performed by: RADIOLOGY

## 2020-03-04 PROCEDURE — 74177 CT ABD & PELVIS W/CONTRAST: CPT

## 2020-03-04 PROCEDURE — 99285 EMERGENCY DEPT VISIT HI MDM: CPT

## 2020-03-04 PROCEDURE — 74011250636 HC RX REV CODE- 250/636: Performed by: SURGERY

## 2020-03-04 PROCEDURE — 80053 COMPREHEN METABOLIC PANEL: CPT

## 2020-03-04 PROCEDURE — 83690 ASSAY OF LIPASE: CPT

## 2020-03-04 PROCEDURE — 74011250636 HC RX REV CODE- 250/636: Performed by: EMERGENCY MEDICINE

## 2020-03-04 PROCEDURE — 74011000258 HC RX REV CODE- 258: Performed by: SURGERY

## 2020-03-04 PROCEDURE — 99218 HC RM OBSERVATION: CPT

## 2020-03-04 PROCEDURE — 96375 TX/PRO/DX INJ NEW DRUG ADDON: CPT

## 2020-03-04 RX ORDER — MORPHINE SULFATE 2 MG/ML
2 INJECTION, SOLUTION INTRAMUSCULAR; INTRAVENOUS
Status: DISCONTINUED | OUTPATIENT
Start: 2020-03-04 | End: 2020-03-06 | Stop reason: HOSPADM

## 2020-03-04 RX ORDER — CALCIUM POLYCARBOPHIL 625 MG
625 TABLET ORAL DAILY
COMMUNITY
End: 2021-02-03 | Stop reason: ALTCHOICE

## 2020-03-04 RX ORDER — ONDANSETRON 2 MG/ML
4 INJECTION INTRAMUSCULAR; INTRAVENOUS
Status: DISCONTINUED | OUTPATIENT
Start: 2020-03-04 | End: 2020-03-06 | Stop reason: HOSPADM

## 2020-03-04 RX ORDER — SODIUM CHLORIDE, SODIUM LACTATE, POTASSIUM CHLORIDE, CALCIUM CHLORIDE 600; 310; 30; 20 MG/100ML; MG/100ML; MG/100ML; MG/100ML
125 INJECTION, SOLUTION INTRAVENOUS CONTINUOUS
Status: DISCONTINUED | OUTPATIENT
Start: 2020-03-04 | End: 2020-03-06 | Stop reason: HOSPADM

## 2020-03-04 RX ADMIN — SODIUM CHLORIDE 1000 ML: 900 INJECTION, SOLUTION INTRAVENOUS at 19:52

## 2020-03-04 RX ADMIN — PIPERACILLIN AND TAZOBACTAM 3.38 G: 3; .375 INJECTION, POWDER, LYOPHILIZED, FOR SOLUTION INTRAVENOUS at 23:13

## 2020-03-04 RX ADMIN — IOPAMIDOL 100 ML: 755 INJECTION, SOLUTION INTRAVENOUS at 20:56

## 2020-03-04 RX ADMIN — SODIUM CHLORIDE 1000 ML: 900 INJECTION, SOLUTION INTRAVENOUS at 23:12

## 2020-03-04 RX ADMIN — SODIUM CHLORIDE, SODIUM LACTATE, POTASSIUM CHLORIDE, AND CALCIUM CHLORIDE 125 ML/HR: 600; 310; 30; 20 INJECTION, SOLUTION INTRAVENOUS at 23:50

## 2020-03-05 ENCOUNTER — ANESTHESIA (OUTPATIENT)
Dept: SURGERY | Age: 50
End: 2020-03-05
Payer: COMMERCIAL

## 2020-03-05 ENCOUNTER — ANESTHESIA EVENT (OUTPATIENT)
Dept: SURGERY | Age: 50
End: 2020-03-05
Payer: COMMERCIAL

## 2020-03-05 VITALS
SYSTOLIC BLOOD PRESSURE: 101 MMHG | WEIGHT: 135 LBS | BODY MASS INDEX: 23.35 KG/M2 | OXYGEN SATURATION: 97 % | RESPIRATION RATE: 14 BRPM | TEMPERATURE: 98.1 F | HEART RATE: 86 BPM | DIASTOLIC BLOOD PRESSURE: 62 MMHG

## 2020-03-05 LAB
BACTERIA SPEC CULT: NORMAL
HCG UR QL: NEGATIVE
SERVICE CMNT-IMP: NORMAL

## 2020-03-05 PROCEDURE — 77010033678 HC OXYGEN DAILY

## 2020-03-05 PROCEDURE — 77030018836 HC SOL IRR NACL ICUM -A: Performed by: SURGERY

## 2020-03-05 PROCEDURE — 77030027876 HC STPLR ENDOSC FLX PWR J&J -G1: Performed by: SURGERY

## 2020-03-05 PROCEDURE — 96375 TX/PRO/DX INJ NEW DRUG ADDON: CPT

## 2020-03-05 PROCEDURE — 81025 URINE PREGNANCY TEST: CPT

## 2020-03-05 PROCEDURE — 77030003578 HC NDL INSUF VERES AMR -B: Performed by: SURGERY

## 2020-03-05 PROCEDURE — 99218 HC RM OBSERVATION: CPT

## 2020-03-05 PROCEDURE — 96372 THER/PROPH/DIAG INJ SC/IM: CPT

## 2020-03-05 PROCEDURE — 77030008684 HC TU ET CUF COVD -B: Performed by: ANESTHESIOLOGY

## 2020-03-05 PROCEDURE — 77030013079 HC BLNKT BAIR HGGR 3M -A: Performed by: ANESTHESIOLOGY

## 2020-03-05 PROCEDURE — 76010000138 HC OR TIME 0.5 TO 1 HR: Performed by: SURGERY

## 2020-03-05 PROCEDURE — 77030008603 HC TRCR ENDOSC EPATH J&J -C: Performed by: SURGERY

## 2020-03-05 PROCEDURE — 88304 TISSUE EXAM BY PATHOLOGIST: CPT

## 2020-03-05 PROCEDURE — 77030002933 HC SUT MCRYL J&J -A: Performed by: SURGERY

## 2020-03-05 PROCEDURE — 96366 THER/PROPH/DIAG IV INF ADDON: CPT

## 2020-03-05 PROCEDURE — 96365 THER/PROPH/DIAG IV INF INIT: CPT

## 2020-03-05 PROCEDURE — 74011000258 HC RX REV CODE- 258: Performed by: SURGERY

## 2020-03-05 PROCEDURE — 77030010031 HC SCIS ENDOSC MPLR J&J -C: Performed by: SURGERY

## 2020-03-05 PROCEDURE — 74011250636 HC RX REV CODE- 250/636: Performed by: SURGERY

## 2020-03-05 PROCEDURE — 77030031139 HC SUT VCRL2 J&J -A: Performed by: SURGERY

## 2020-03-05 PROCEDURE — 74011000250 HC RX REV CODE- 250: Performed by: SURGERY

## 2020-03-05 PROCEDURE — 77030036731 HC STPLR ENDOSC J&J -F: Performed by: SURGERY

## 2020-03-05 PROCEDURE — 77030011640 HC PAD GRND REM COVD -A: Performed by: SURGERY

## 2020-03-05 PROCEDURE — 77030008602 HC TRCR ENDOSC EPATH J&J -B: Performed by: SURGERY

## 2020-03-05 PROCEDURE — 74011000250 HC RX REV CODE- 250: Performed by: NURSE ANESTHETIST, CERTIFIED REGISTERED

## 2020-03-05 PROCEDURE — 77030008756 HC TU IRR SUC STRY -B: Performed by: SURGERY

## 2020-03-05 PROCEDURE — 77030040506 HC DRN WND MDII -A: Performed by: SURGERY

## 2020-03-05 PROCEDURE — 77030009967 HC RELD STPLR ENDOSC J&J -C: Performed by: SURGERY

## 2020-03-05 PROCEDURE — 76060000032 HC ANESTHESIA 0.5 TO 1 HR: Performed by: SURGERY

## 2020-03-05 PROCEDURE — 76210000006 HC OR PH I REC 0.5 TO 1 HR: Performed by: SURGERY

## 2020-03-05 PROCEDURE — 77030009963 HC RELD STPLR ECR J&J -C: Performed by: SURGERY

## 2020-03-05 PROCEDURE — 74011250636 HC RX REV CODE- 250/636: Performed by: NURSE ANESTHETIST, CERTIFIED REGISTERED

## 2020-03-05 PROCEDURE — 77030020747 HC TU INSUF ENDOSC TELE -A: Performed by: SURGERY

## 2020-03-05 PROCEDURE — 77030007955 HC PCH ENDOSC SPEC J&J -B: Performed by: SURGERY

## 2020-03-05 PROCEDURE — 94760 N-INVAS EAR/PLS OXIMETRY 1: CPT

## 2020-03-05 PROCEDURE — 77030026438 HC STYL ET INTUB CARD -A: Performed by: ANESTHESIOLOGY

## 2020-03-05 PROCEDURE — 77030010507 HC ADH SKN DERMBND J&J -B: Performed by: SURGERY

## 2020-03-05 RX ORDER — ROCURONIUM BROMIDE 10 MG/ML
INJECTION, SOLUTION INTRAVENOUS AS NEEDED
Status: DISCONTINUED | OUTPATIENT
Start: 2020-03-05 | End: 2020-03-05 | Stop reason: HOSPADM

## 2020-03-05 RX ORDER — ALBUTEROL SULFATE 0.83 MG/ML
2.5 SOLUTION RESPIRATORY (INHALATION) AS NEEDED
Status: DISCONTINUED | OUTPATIENT
Start: 2020-03-05 | End: 2020-03-05 | Stop reason: HOSPADM

## 2020-03-05 RX ORDER — KETOROLAC TROMETHAMINE 30 MG/ML
INJECTION, SOLUTION INTRAMUSCULAR; INTRAVENOUS AS NEEDED
Status: DISCONTINUED | OUTPATIENT
Start: 2020-03-05 | End: 2020-03-05 | Stop reason: HOSPADM

## 2020-03-05 RX ORDER — LIDOCAINE HYDROCHLORIDE 20 MG/ML
INJECTION, SOLUTION EPIDURAL; INFILTRATION; INTRACAUDAL; PERINEURAL AS NEEDED
Status: DISCONTINUED | OUTPATIENT
Start: 2020-03-05 | End: 2020-03-05 | Stop reason: HOSPADM

## 2020-03-05 RX ORDER — SUCCINYLCHOLINE CHLORIDE 20 MG/ML
INJECTION INTRAMUSCULAR; INTRAVENOUS AS NEEDED
Status: DISCONTINUED | OUTPATIENT
Start: 2020-03-05 | End: 2020-03-05 | Stop reason: HOSPADM

## 2020-03-05 RX ORDER — PROCHLORPERAZINE EDISYLATE 5 MG/ML
10 INJECTION INTRAMUSCULAR; INTRAVENOUS
Status: DISCONTINUED | OUTPATIENT
Start: 2020-03-05 | End: 2020-03-05

## 2020-03-05 RX ORDER — GLYCOPYRROLATE 0.2 MG/ML
INJECTION INTRAMUSCULAR; INTRAVENOUS AS NEEDED
Status: DISCONTINUED | OUTPATIENT
Start: 2020-03-05 | End: 2020-03-05 | Stop reason: HOSPADM

## 2020-03-05 RX ORDER — DEXAMETHASONE SODIUM PHOSPHATE 4 MG/ML
INJECTION, SOLUTION INTRA-ARTICULAR; INTRALESIONAL; INTRAMUSCULAR; INTRAVENOUS; SOFT TISSUE AS NEEDED
Status: DISCONTINUED | OUTPATIENT
Start: 2020-03-05 | End: 2020-03-05 | Stop reason: HOSPADM

## 2020-03-05 RX ORDER — NEOSTIGMINE METHYLSULFATE 1 MG/ML
INJECTION INTRAVENOUS AS NEEDED
Status: DISCONTINUED | OUTPATIENT
Start: 2020-03-05 | End: 2020-03-05 | Stop reason: HOSPADM

## 2020-03-05 RX ORDER — PROPOFOL 10 MG/ML
INJECTION, EMULSION INTRAVENOUS AS NEEDED
Status: DISCONTINUED | OUTPATIENT
Start: 2020-03-05 | End: 2020-03-05 | Stop reason: HOSPADM

## 2020-03-05 RX ORDER — KETOROLAC TROMETHAMINE 30 MG/ML
30 INJECTION, SOLUTION INTRAMUSCULAR; INTRAVENOUS
Status: DISCONTINUED | OUTPATIENT
Start: 2020-03-05 | End: 2020-03-06 | Stop reason: HOSPADM

## 2020-03-05 RX ORDER — BUPIVACAINE HYDROCHLORIDE AND EPINEPHRINE 5; 5 MG/ML; UG/ML
INJECTION, SOLUTION EPIDURAL; INTRACAUDAL; PERINEURAL AS NEEDED
Status: DISCONTINUED | OUTPATIENT
Start: 2020-03-05 | End: 2020-03-05 | Stop reason: HOSPADM

## 2020-03-05 RX ORDER — ONDANSETRON 2 MG/ML
INJECTION INTRAMUSCULAR; INTRAVENOUS AS NEEDED
Status: DISCONTINUED | OUTPATIENT
Start: 2020-03-05 | End: 2020-03-05 | Stop reason: HOSPADM

## 2020-03-05 RX ORDER — OXYCODONE AND ACETAMINOPHEN 5; 325 MG/1; MG/1
1-2 TABLET ORAL
Status: DISCONTINUED | OUTPATIENT
Start: 2020-03-05 | End: 2020-03-06 | Stop reason: HOSPADM

## 2020-03-05 RX ORDER — HYDROMORPHONE HYDROCHLORIDE 1 MG/ML
0.5 INJECTION, SOLUTION INTRAMUSCULAR; INTRAVENOUS; SUBCUTANEOUS
Status: DISCONTINUED | OUTPATIENT
Start: 2020-03-05 | End: 2020-03-05 | Stop reason: HOSPADM

## 2020-03-05 RX ORDER — SODIUM CHLORIDE, SODIUM LACTATE, POTASSIUM CHLORIDE, CALCIUM CHLORIDE 600; 310; 30; 20 MG/100ML; MG/100ML; MG/100ML; MG/100ML
150 INJECTION, SOLUTION INTRAVENOUS CONTINUOUS
Status: DISCONTINUED | OUTPATIENT
Start: 2020-03-05 | End: 2020-03-05 | Stop reason: HOSPADM

## 2020-03-05 RX ORDER — ACETAMINOPHEN 325 MG/1
650 TABLET ORAL
Status: DISCONTINUED | OUTPATIENT
Start: 2020-03-05 | End: 2020-03-06 | Stop reason: HOSPADM

## 2020-03-05 RX ORDER — FENTANYL CITRATE 50 UG/ML
INJECTION, SOLUTION INTRAMUSCULAR; INTRAVENOUS AS NEEDED
Status: DISCONTINUED | OUTPATIENT
Start: 2020-03-05 | End: 2020-03-05 | Stop reason: HOSPADM

## 2020-03-05 RX ORDER — SODIUM CHLORIDE, SODIUM LACTATE, POTASSIUM CHLORIDE, CALCIUM CHLORIDE 600; 310; 30; 20 MG/100ML; MG/100ML; MG/100ML; MG/100ML
125 INJECTION, SOLUTION INTRAVENOUS CONTINUOUS
Status: DISCONTINUED | OUTPATIENT
Start: 2020-03-05 | End: 2020-03-05 | Stop reason: HOSPADM

## 2020-03-05 RX ORDER — PROMETHAZINE HYDROCHLORIDE 25 MG/ML
25 INJECTION, SOLUTION INTRAMUSCULAR; INTRAVENOUS
Status: DISCONTINUED | OUTPATIENT
Start: 2020-03-05 | End: 2020-03-06 | Stop reason: HOSPADM

## 2020-03-05 RX ORDER — DIPHENHYDRAMINE HYDROCHLORIDE 50 MG/ML
12.5 INJECTION, SOLUTION INTRAMUSCULAR; INTRAVENOUS AS NEEDED
Status: DISCONTINUED | OUTPATIENT
Start: 2020-03-05 | End: 2020-03-05 | Stop reason: HOSPADM

## 2020-03-05 RX ORDER — ACETAMINOPHEN 325 MG/1
650 TABLET ORAL
Qty: 30 TAB | Refills: 0 | Status: SHIPPED | OUTPATIENT
Start: 2020-03-05 | End: 2021-02-03 | Stop reason: ALTCHOICE

## 2020-03-05 RX ORDER — ONDANSETRON 2 MG/ML
4 INJECTION INTRAMUSCULAR; INTRAVENOUS AS NEEDED
Status: DISCONTINUED | OUTPATIENT
Start: 2020-03-05 | End: 2020-03-05 | Stop reason: HOSPADM

## 2020-03-05 RX ORDER — TRAMADOL HYDROCHLORIDE 50 MG/1
50 TABLET ORAL
Qty: 25 TAB | Refills: 0 | Status: SHIPPED | OUTPATIENT
Start: 2020-03-05 | End: 2020-03-10

## 2020-03-05 RX ORDER — LIDOCAINE HYDROCHLORIDE 10 MG/ML
0.1 INJECTION, SOLUTION EPIDURAL; INFILTRATION; INTRACAUDAL; PERINEURAL AS NEEDED
Status: DISCONTINUED | OUTPATIENT
Start: 2020-03-05 | End: 2020-03-05 | Stop reason: HOSPADM

## 2020-03-05 RX ADMIN — FENTANYL CITRATE 100 MCG: 50 INJECTION, SOLUTION INTRAMUSCULAR; INTRAVENOUS at 09:54

## 2020-03-05 RX ADMIN — PROMETHAZINE HYDROCHLORIDE 25 MG: 25 INJECTION INTRAMUSCULAR; INTRAVENOUS at 20:34

## 2020-03-05 RX ADMIN — NEOSTIGMINE METHYLSULFATE 3 MG: 1 INJECTION, SOLUTION INTRAVENOUS at 10:30

## 2020-03-05 RX ADMIN — PROPOFOL 120 MG: 10 INJECTION, EMULSION INTRAVENOUS at 09:54

## 2020-03-05 RX ADMIN — PIPERACILLIN AND TAZOBACTAM 3.38 G: 3; .375 INJECTION, POWDER, LYOPHILIZED, FOR SOLUTION INTRAVENOUS at 04:31

## 2020-03-05 RX ADMIN — FENTANYL CITRATE 50 MCG: 50 INJECTION, SOLUTION INTRAMUSCULAR; INTRAVENOUS at 10:19

## 2020-03-05 RX ADMIN — FENTANYL CITRATE 50 MCG: 50 INJECTION, SOLUTION INTRAMUSCULAR; INTRAVENOUS at 10:07

## 2020-03-05 RX ADMIN — GLYCOPYRROLATE 0.6 MG: 0.2 INJECTION, SOLUTION INTRAMUSCULAR; INTRAVENOUS at 10:30

## 2020-03-05 RX ADMIN — PROPOFOL 30 MG: 10 INJECTION, EMULSION INTRAVENOUS at 10:33

## 2020-03-05 RX ADMIN — ONDANSETRON 4 MG: 2 INJECTION INTRAMUSCULAR; INTRAVENOUS at 17:50

## 2020-03-05 RX ADMIN — ROCURONIUM BROMIDE 10 MG: 50 INJECTION, SOLUTION INTRAVENOUS at 10:04

## 2020-03-05 RX ADMIN — SODIUM CHLORIDE, SODIUM LACTATE, POTASSIUM CHLORIDE, AND CALCIUM CHLORIDE 125 ML/HR: 600; 310; 30; 20 INJECTION, SOLUTION INTRAVENOUS at 08:45

## 2020-03-05 RX ADMIN — DEXAMETHASONE SODIUM PHOSPHATE 4 MG: 4 INJECTION, SOLUTION INTRAMUSCULAR; INTRAVENOUS at 10:05

## 2020-03-05 RX ADMIN — SUCCINYLCHOLINE CHLORIDE 100 MG: 20 INJECTION, SOLUTION INTRAMUSCULAR; INTRAVENOUS; PARENTERAL at 09:54

## 2020-03-05 RX ADMIN — PIPERACILLIN AND TAZOBACTAM 3.38 G: 3; .375 INJECTION, POWDER, LYOPHILIZED, FOR SOLUTION INTRAVENOUS at 15:09

## 2020-03-05 RX ADMIN — ROCURONIUM BROMIDE 10 MG: 50 INJECTION, SOLUTION INTRAVENOUS at 09:54

## 2020-03-05 RX ADMIN — LIDOCAINE HYDROCHLORIDE 60 MG: 20 INJECTION, SOLUTION INTRAVENOUS at 09:54

## 2020-03-05 RX ADMIN — KETOROLAC TROMETHAMINE 30 MG: 30 INJECTION INTRAMUSCULAR; INTRAVENOUS at 10:25

## 2020-03-05 RX ADMIN — MIDAZOLAM HYDROCHLORIDE 2 MG: 2 INJECTION, SOLUTION INTRAMUSCULAR; INTRAVENOUS at 09:50

## 2020-03-05 RX ADMIN — ONDANSETRON 4 MG: 2 INJECTION INTRAMUSCULAR; INTRAVENOUS at 10:05

## 2020-03-05 RX ADMIN — KETOROLAC TROMETHAMINE 30 MG: 30 INJECTION, SOLUTION INTRAMUSCULAR at 11:52

## 2020-03-05 NOTE — PERIOP NOTES
TRANSFER - OUT REPORT:    Verbal report given to Gabe Peterson RN on Lisa Arredondo  being transferred to [address] [place] [address] for routine post - op       Report consisted of patients Situation, Background, Assessment and   Recommendations(SBAR). Information from the following report(s) SBAR, Intake/Output and MAR was reviewed with the receiving nurse. Lines:   Peripheral IV 03/04/20 Left Antecubital (Active)   Site Assessment Clean, dry, & intact 3/5/2020  8:44 AM   Phlebitis Assessment 0 3/5/2020  8:44 AM   Infiltration Assessment 0 3/5/2020  8:44 AM   Dressing Status Clean, dry, & intact 3/5/2020  8:44 AM   Dressing Type Transparent;Tape 3/5/2020  8:44 AM   Hub Color/Line Status Pink; Infusing 3/5/2020  8:44 AM   Action Taken Open ports on tubing capped 3/4/2020 11:25 PM   Alcohol Cap Used Yes 3/5/2020  8:44 AM        Opportunity for questions and clarification was provided.       Patient transported with:   Registered Nurse

## 2020-03-05 NOTE — ANESTHESIA PREPROCEDURE EVALUATION
Relevant Problems   No relevant active problems       Anesthetic History               Review of Systems / Medical History  Patient summary reviewed, nursing notes reviewed and pertinent labs reviewed    Pulmonary  Within defined limits                 Neuro/Psych   Within defined limits           Cardiovascular  Within defined limits                Exercise tolerance: >4 METS     GI/Hepatic/Renal  Within defined limits              Endo/Other  Within defined limits           Other Findings              Physical Exam    Airway  Mallampati: II  TM Distance: 4 - 6 cm  Neck ROM: normal range of motion   Mouth opening: Normal     Cardiovascular    Rhythm: regular  Rate: normal         Dental  No notable dental hx       Pulmonary                 Abdominal         Other Findings            Anesthetic Plan    ASA: 1, emergent  Anesthesia type: general          Induction: Intravenous  Anesthetic plan and risks discussed with: Patient

## 2020-03-05 NOTE — H&P
Surgery History and Physical    Subjective:      Jasiel Ott is a 52 y.o. female who presented to the ED with c/o abdominal since Tuesday evening. States she developed general periumbilical cramping that has gotten worse and more persistent. This was accompanied by anorexia. She had an episode of diarrhea yesterday. Patient was unable to get in with her PCP so called dispatch health. She apparently had a fever and was directed to the ED where she was found to have leukocytosis and a CT showing non-perforated appendicitis. Past Medical History:   Diagnosis Date    Atypical nevus     right arm    Eustachian tube dysfunction     ENT chronic \"crackling in ears\" w swallowing. CT 2018 normal    Heart palpitations 2017    House dust mite allergy     Dr. Lester Samaniego    Nephrolithiasis     Pap smear for cervical cancer screening 2015    Dr. Mer Hernandez    Scoliosis     mild- adult    Shortened OK interval 2017    Vertigo 2016     Past Surgical History:   Procedure Laterality Date    HX  SECTION  2002    HX TONSILLECTOMY  1976      Family History   Problem Relation Age of Onset    Cancer Maternal Grandmother 67        gallbladder?     Heart Attack Maternal Grandfather 76    Prostate Cancer Paternal Grandfather     Coronary Artery Disease Father 67    Arrhythmia Father         ablation    Diabetes Neg Hx     Hypertension Neg Hx     High Cholesterol Neg Hx      Social History     Socioeconomic History    Marital status:      Spouse name: Ramon Bah Number of children: 1    Years of education: Not on file    Highest education level: Not on file   Occupational History    Occupation:  [address]     Employer: [employer]   Tobacco Use    Smoking status: Never Smoker    Smokeless tobacco: Never Used   Substance and Sexual Activity    Alcohol use: Yes     Comment: one drink per week    Sexual activity: Yes   Social History Narrative    1 daughter (senior) - going to [facility] 2020      Current Facility-Administered Medications   Medication Dose Route Frequency    lactated Ringers infusion  150 mL/hr IntraVENous CONTINUOUS    lidocaine (PF) (XYLOCAINE) 10 mg/mL (1 %) injection 0.1 mL  0.1 mL SubCUTAneous PRN    lactated Ringers infusion  125 mL/hr IntraVENous CONTINUOUS    ondansetron (ZOFRAN) injection 4 mg  4 mg IntraVENous Q6H PRN    morphine injection 2 mg  2 mg IntraVENous Q2H PRN    piperacillin-tazobactam (ZOSYN) 3.375 g in 0.9% sodium chloride (MBP/ADV) 100 mL  3.375 g IntraVENous Q8H      Allergies   Allergen Reactions    Vicodin [Hydrocodone-Acetaminophen] Other (comments) and Vertigo     Feels terrible all over-dizziness, shaking, severe nausea       Review of Systems:REVIEW OF SYSTEMS:     []     Unable to obtain  ROS due to  []    mental status change  []    sedated   []    intubated   []    Total of 12 systems reviewed as follows:    Constitutional: + for fevers and anorexia, negative for chills, weight loss, malaise  Eyes: negative for blurry vision  Ears, nose, mouth, throat, and face: negative for sore throat  Respiratory: negative for SOB  Cardiovascular: negative for CP  Gastrointestinal: + abdominal pain and diarrhea negative for nausea, vomiting, constipation, melena, hematochezia  Genitourinary: negative for dysuria  Integument/breast: neg for skin rash  Hematologic/lymphatic: neg for bruising  Musculoskeletal: negative for muscle aches  Neurological: no dizziness or h/a    Objective:        Patient Vitals for the past 8 hrs:   BP Temp Pulse Resp SpO2   20 0822 127/72 98.2 °F (36.8 °C) 76 16 99 %   20 0733 127/85 98 °F (36.7 °C) 88 16 98 %   20 0342 120/71 98.2 °F (36.8 °C) 77 18 96 %       Temp (24hrs), Av.5 °F (36.9 °C), Min:98 °F (36.7 °C), Max:99.4 °F (37.4 °C)      Physical Exam:  General:  Alert, cooperative, no distress, appears stated age. Eyes:  Conjunctivae/corneas clear.     Nose: Nares normal. Septum midline   Mouth/Throat: Lips, mucosa, and tongue normal.    Neck: Supple, symmetrical, trachea midline   Lungs:   Clear to auscultation bilaterally. Heart:  Regular rate and rhythm   Abdomen:   Soft, mild RLQ tenderness, non-distended, no rebound, no guarding, normal bowel sounds   Extremities: Extremities normal, atraumatic, no cyanosis or edema. Skin: Skin color, texture, turgor normal. No rashes or lesions   Neuro: Alert, oriented, speech clear     Labs:   Recent Labs     03/04/20 1952   WBC 18.4*   HGB 13.2   HCT 39.9        Recent Labs     03/04/20 1952      K 3.7      CO2 23   GLU 96   BUN 16   CREA 0.84   CA 9.2   ALB 4.0   TBILI 0.8   SGOT 15   ALT 19     No results for input(s): INR, INREXT in the last 72 hours. Assessment and Plan:     Acute appendicitis    Two days of increasing abdominal pain. Imaging and exam consistent with appendicitis. Plan for lap appendectomy today by Dr. Jose Patino. Discussed indications for surgery, basics of procedure, and expected recovery with patient and her . All questions answered.        Signed By: GRACE Prado     March 5, 2020

## 2020-03-05 NOTE — BRIEF OP NOTE
BRIEF OPERATIVE NOTE    Date of Procedure: 3/5/2020   Preoperative Diagnosis: Appendicitis  Postoperative Diagnosis: Appendicitis    Procedure(s):  APPENDECTOMY LAPAROSCOPIC  Surgeon(s) and Role:     Lyle Elizabeth MD - Primary         Surgical Assistant: Zaida POLLOCK    Surgical Staff:  Sander Duran: Teresita Dukes, VENUS  Scrub Tech-1: Paula Ruelas  Surg Asst-1: Elidia Cerda  Event Time In Time Out   Incision Start 03/05/2020 1007    Incision Close       Anesthesia: General   Estimated Blood Loss: 5cc  Specimens:   ID Type Source Tests Collected by Time Destination   1 : appendix Preservative Appendix  Charity Cardoso MD 3/5/2020 1015 Pathology      Findings: moderate inflammation, no perf  Complications: none  Implants: none

## 2020-03-05 NOTE — ED PROVIDER NOTES
52 y.o. female with past medical history significant for eustachian tube dysfunction, nephrolithiasis, scoliosis, vertigo, shortened NV interval, and heart palpitations who presents from private vehicle with chief complaint of abdominal pain. Pt c/o generalized abdominal pain with accompanying decreased appetite and dysuria since 2200 last night. Pt states the abdominal pain is dull and sometimes sharp. Pt notes she was evaluated at [facility] approximately 1 hour ago and was febrile with a temperature of \"101.1 F\". Pt is currently afebrile in the ED. Pt's urine was positive for leukocytes at [facility]. Pt's last meal was at approximately 1300 today; Pt's states she drank water at approximately 1700 today. Pt endorses abdominal surgical hx of  18 years ago. Pt denies N/V or hx of diverticulosis, ulcer, pancreatitis, cholecystectomy and appendectomy. There are no other acute medical concerns at this time. PCP: Lorna Moralez MD     Note written by Dmitri Bowens, as dictated by Doris Flores MD 7:30 PM               Past Medical History:   Diagnosis Date    Atypical nevus     right arm    Eustachian tube dysfunction     ENT chronic \"crackling in ears\" w swallowing. CT 2018 normal    Heart palpitations 2017    House dust mite allergy     Dr. Maria Dolores Herrera    Nephrolithiasis     Pap smear for cervical cancer screening 2015    Dr. Susy Apodaca    Scoliosis     mild- adult    Shortened NV interval 2017    Vertigo 2016       Past Surgical History:   Procedure Laterality Date    HX  SECTION      HX TONSILLECTOMY           Family History:   Problem Relation Age of Onset    Cancer Maternal Grandmother 67        gallbladder?     Heart Attack Maternal Grandfather 76    Prostate Cancer Paternal Grandfather     Coronary Artery Disease Father 67    Arrhythmia Father         ablation    Diabetes Neg Hx     Hypertension Neg Hx     High Cholesterol Neg Hx        Social History     Socioeconomic History    Marital status:      Spouse name: Juan Albarado Number of children: 1    Years of education: Not on file    Highest education level: Not on file   Occupational History    Occupation:  [email or web address]     Employer: [employer] [employer] Financial resource strain: Not on file    Food insecurity:     Worry: Not on file     Inability: Not on file   [employer] needs:     Medical: Not on file     Non-medical: Not on file   Tobacco Use    Smoking status: Never Smoker    Smokeless tobacco: Never Used   Substance and Sexual Activity    Alcohol use: Yes     Comment: one drink per week    Drug use: Not on file    Sexual activity: Yes   Lifestyle    Physical activity:     Days per week: Not on file     Minutes per session: Not on file    Stress: Not on file   Relationships    Social connections:     Talks on phone: Not on file     Gets together: Not on file     Attends Episcopalian service: Not on file     Active member of club or organization: Not on file     Attends meetings of clubs or organizations: Not on file     Relationship status: Not on file    Intimate partner violence:     Fear of current or ex partner: Not on file     Emotionally abused: Not on file     Physically abused: Not on file     Forced sexual activity: Not on file   Other Topics Concern    Not on file   Social History Narrative    1 daughter (senior) - going to [facility] fall 2020         ALLERGIES: Vicodin [hydrocodone-acetaminophen]    Review of Systems   Constitutional: Positive for appetite change. Negative for fever. Eyes: Negative for visual disturbance. Respiratory: Negative for cough, shortness of breath and wheezing. Cardiovascular: Negative for chest pain and leg swelling. Gastrointestinal: Positive for abdominal pain. Negative for diarrhea, nausea and vomiting. Genitourinary: Positive for dysuria. Musculoskeletal: Negative.   Negative for back pain and neck stiffness. Skin: Negative for rash. Neurological: Negative. Negative for syncope and headaches. Psychiatric/Behavioral: Negative for confusion. All other systems reviewed and are negative. Vitals:    03/04/20 1932   BP: 139/71   Pulse: (!) 101   Resp: 16   Temp: 98.7 °F (37.1 °C)   SpO2: 100%   Weight: 61.2 kg (135 lb)            Physical Exam  Vitals signs and nursing note reviewed. Constitutional:       General: She is not in acute distress. Appearance: She is well-developed. HENT:      Head: Normocephalic. Eyes:      Pupils: Pupils are equal, round, and reactive to light. Neck:      Musculoskeletal: Normal range of motion. Cardiovascular:      Rate and Rhythm: Normal rate and regular rhythm. Heart sounds: No murmur. Pulmonary:      Effort: Pulmonary effort is normal. No respiratory distress. Breath sounds: Normal breath sounds. Abdominal:      Palpations: Abdomen is soft. Tenderness: There is abdominal tenderness in the suprapubic area and left lower quadrant. Comments: Tenderness in the LLQ and suprapubic area. Musculoskeletal: Normal range of motion. Skin:     General: Skin is warm and dry. Neurological:      Mental Status: She is alert. Cranial Nerves: No cranial nerve deficit. Psychiatric:         Behavior: Behavior normal.     Note written by Dmitri Veronica, as dictated by Ian Carlson MD 7:30 PM       MDM       Procedures    PROGRESS NOTE:  9:20 PM  Radiology thinks early appendicitis. PROGRESS NOTE:  9:26 PM  Re-examined Pt, who's now tender in the suprapubic and RLQ. Spoke c dr. Chucho Lyon - gen. Surg. He will admit.

## 2020-03-05 NOTE — ED TRIAGE NOTES
Patient reports medial abdominal pain starting last night. Had one episode of a watery bowel movement with a decreased appetite. Reports increased pain with walking and chills. Referred by [facility]. Denies any nausea or vomiting.

## 2020-03-05 NOTE — ANESTHESIA POSTPROCEDURE EVALUATION
Procedure(s):  APPENDECTOMY LAPAROSCOPIC. general    Anesthesia Post Evaluation        Patient location during evaluation: bedside  Level of consciousness: awake  Pain management: satisfactory to patient  Airway patency: patent  Anesthetic complications: no  Cardiovascular status: acceptable  Respiratory status: acceptable  Hydration status: acceptable        Vitals Value Taken Time   /54 3/5/2020 11:00 AM   Temp 37.1 °C (98.7 °F) 3/5/2020 10:46 AM   Pulse 77 3/5/2020 11:05 AM   Resp 15 3/5/2020 11:05 AM   SpO2 100 % 3/5/2020 11:05 AM   Vitals shown include unvalidated device data.

## 2020-03-05 NOTE — DISCHARGE INSTRUCTIONS
Patient Education        Learning About Appendectomy  What is an appendectomy? An appendectomy is surgery to take out the appendix. This organ is a small sac that is shaped like a finger. It's attached to your large intestine. Appendicitis happens when the appendix becomes infected and inflamed. An appendectomy is the main treatment for it. If surgery is delayed, the inflamed appendix may burst. A burst appendix can cause serious health problems. If your appendix has burst, you may need an emergency surgery to remove the burst appendix. How is this surgery done? Before surgery, you will get medicine to make you sleep. Appendectomy is usually done as a laparoscopic surgery. That means it is done with only small cuts. These cuts are called incisions. The doctor puts a lighted tube, or scope, and other surgical tools through the cuts in your belly. The doctor is able to see your organs with the scope. The doctor removes the appendix. The cuts heal quickly, and the scars usually fade over time. In some cases, the surgery is done through a single larger cut in the belly. This is called open surgery. What can you expect after surgery? Most people leave the hospital 1 to 3 days after surgery. Some even go home the same day. After you go home, it is normal to feel weak and tired for several days. Your belly may be swollen and may be painful. If you had laparoscopic surgery, you may have shoulder pain for about 24 hours. You may also feel sick to your stomach and have diarrhea, constipation, gas, or a headache. This usually goes away in a few days. Your recovery time depends on the type of surgery you had. If you had laparoscopic surgery, you will probably be able to go back to work or your normal routine 1 to 3 weeks after surgery. If you had an open surgery, it may take 2 to 4 weeks. If your appendix burst, you may have a drain in your incision. Your body will work fine without an appendix.  You will not have to make any changes in your diet or daily life. After surgery, be sure to follow your doctor's advice about problems to watch for. These may include fever, worse belly pain, or problems with your incision. Follow-up care is a key part of your treatment and safety. Be sure to make and go to all appointments, and call your doctor if you are having problems. It's also a good idea to know your test results and keep a list of the medicines you take. Where can you learn more? Go to http://rodger-adriana.info/. Enter Y856 in the search box to learn more about \"Learning About Appendectomy. \"  Current as of: November 7, 2018  Content Version: 12.2  © 1859-7111 [employer]. Care instructions adapted under license by [employer] (which disclaims liability or warranty for this information). If you have questions about a medical condition or this instruction, always ask your healthcare professional. [place] [address] any warranty or liability for your use of this information.

## 2020-03-05 NOTE — ED NOTES
TRANSFER - OUT REPORT:    Verbal report given to Diane(name) on Kyra Kern  being transferred to 432(unit) for routine progression of care       Report consisted of patients Situation, Background, Assessment and   Recommendations(SBAR). Information from the following report(s) SBAR, ED Summary and MAR was reviewed with the receiving nurse. Lines:   Peripheral IV 03/04/20 Left Antecubital (Active)   Site Assessment Clean, dry, & intact 3/4/2020  7:51 PM   Phlebitis Assessment 0 3/4/2020  7:51 PM   Infiltration Assessment 0 3/4/2020  7:51 PM   Dressing Status Clean, dry, & intact 3/4/2020  7:51 PM   Dressing Type Transparent 3/4/2020  7:51 PM   Hub Color/Line Status Pink 3/4/2020  7:51 PM        Opportunity for questions and clarification was provided.       Patient transported with:   Registered Nurse

## 2020-03-06 ENCOUNTER — PATIENT OUTREACH (OUTPATIENT)
Dept: INTERNAL MEDICINE CLINIC | Age: 50
End: 2020-03-06

## 2020-03-06 NOTE — OP NOTES
Mehran Dodge [facility] 81  OPERATIVE REPORT    Name:  Jamari Dye  MR#:  [id number]  :  1970  ACCOUNT #:  [de-identified]  DATE OF SERVICE:  2020    PREOPERATIVE DIAGNOSIS:  Acute appendicitis. POSTOPERATIVE DIAGNOSIS:  Acute appendicitis. PROCEDURE PERFORMED:  Laparoscopic appendectomy. SURGEON:  Ozzy Messina M.D.    ASSISTANT:  Pink Brunner May, SA.    ANESTHESIA:  General.    ESTIMATED BLOOD LOSS:  Minimal.    COUNTS:  Sponge, needle, and instrument counts were correct. DRAINS:  There were no drains. COMPLICATION:  There were no complications. IMPLANTS:  None    SPECIMENS REMOVED:  Appendix. POSTOPERATIVE CONDITION:  Good. INTRAOPERATIVE FINDINGS:  Moderate thickening and inflammation with no other sign of perforation. INDICATIONS FOR PROCEDURE:  A very pleasant but unfortunate 46-year-old  woman who presents with abdominal pain. Abdominal exam and CT scan findings consistent with acute appendicitis. She is now to undergo laparoscopic appendectomy for the same. DESCRIPTION OF PROCEDURE:  After appropriate informed consent was obtained with potential complications including but not limited to bleeding, infection, conversion to open removal of normal appendix, and need to address, other intraabdominal pathology, possible need for drain placement, prolonged hospitalization, prolonged IV antibiotics, followup CT, CT-guided drainage needing reoperation especially if it would be perforated. The patient was subsequently brought to the operating room and placed in supine position. General anesthesia was induced. The abdomen was sterilely prepped and draped. She was already on IV Zosyn on the floor, so no additional antibiotics were warranted or given. After adequate pneumoperitoneum was gained, a Veress needle with 72-UR supraumbilical trocar was inserted.   The site underneath was surveyed to exclude occult bowel injury; and then a 5-mm suprapubic and 5-mm left lower quadrant trocars were placed under direct vision. Dissection was began in the right lower quadrant. The appendix was readily and easily identified. It was gently elevated superiorly and medially. The mesoappendix was dissected free at the base and it was divided with a vascular load on the Endo YURY 45-mm stapler. The appendix was taken down right where it ramified off the base of the cecum at the teniae coli and where the terminal ileum ramified in medially and it was divided right at the base of the cecum with no residual stump left. This was done with a bowel load on the Endo YURY 45-mm stapler. It was placed in an EndoCatch pouch for later extraction. The right lower quadrant was triply irrigated and checked up for hemostasis. Trocars were removed as the pneumoperitoneum was relieved and the specimen was extracted through the umbilical trocar site in an Endo Catch pouch and sent for pathology. The wounds were inspected for hemostasis and fascia at the umbilicus was reapproximated using interrupted figure-of-eight sutures of 0 Vicryl. Wounds were infiltrated with local anesthetic and closed using running absorbable sutures and sealed with Dermabond. The patient was released from the OR to the recovery room in stable condition.     Thank you for your kind referral.      Nilsa Carlson MD      [id number]  D:  03/05/2020 10:39  T:  03/05/2020 21:40  JOB #:  [id number]  CC:  Danyell Dobbins MD

## 2020-03-09 RX ORDER — MIDAZOLAM HYDROCHLORIDE 1 MG/ML
INJECTION, SOLUTION INTRAMUSCULAR; INTRAVENOUS AS NEEDED
Status: DISCONTINUED | OUTPATIENT
Start: 2020-03-05 | End: 2020-03-09 | Stop reason: HOSPADM

## 2020-03-09 NOTE — ADDENDUM NOTE
Addendum  created 03/09/20 0745 by Cachorro Gentile CRNA    Intraprocedure Meds edited, Orders acknowledged in Narrator

## 2021-02-03 ENCOUNTER — OFFICE VISIT (OUTPATIENT)
Dept: INTERNAL MEDICINE CLINIC | Age: 51
End: 2021-02-03
Payer: COMMERCIAL

## 2021-02-03 VITALS
OXYGEN SATURATION: 100 % | WEIGHT: 127 LBS | RESPIRATION RATE: 14 BRPM | TEMPERATURE: 97.7 F | HEART RATE: 68 BPM | SYSTOLIC BLOOD PRESSURE: 115 MMHG | DIASTOLIC BLOOD PRESSURE: 75 MMHG | BODY MASS INDEX: 24.94 KG/M2 | HEIGHT: 60 IN

## 2021-02-03 DIAGNOSIS — Z00.00 PREVENTATIVE HEALTH CARE: Primary | ICD-10-CM

## 2021-02-03 DIAGNOSIS — R00.2 HEART PALPITATIONS: ICD-10-CM

## 2021-02-03 LAB
ALBUMIN SERPL-MCNC: 3.9 G/DL (ref 3.5–5)
ALBUMIN/GLOB SERPL: 1.3 {RATIO} (ref 1.1–2.2)
ALP SERPL-CCNC: 74 U/L (ref 45–117)
ALT SERPL-CCNC: 26 U/L (ref 12–78)
ANION GAP SERPL CALC-SCNC: 1 MMOL/L (ref 5–15)
AST SERPL-CCNC: 18 U/L (ref 15–37)
BILIRUB SERPL-MCNC: 0.5 MG/DL (ref 0.2–1)
BUN SERPL-MCNC: 18 MG/DL (ref 6–20)
BUN/CREAT SERPL: 22 (ref 12–20)
CALCIUM SERPL-MCNC: 9.5 MG/DL (ref 8.5–10.1)
CHLORIDE SERPL-SCNC: 108 MMOL/L (ref 97–108)
CHOLEST SERPL-MCNC: 220 MG/DL
CO2 SERPL-SCNC: 29 MMOL/L (ref 21–32)
CREAT SERPL-MCNC: 0.83 MG/DL (ref 0.55–1.02)
ERYTHROCYTE [DISTWIDTH] IN BLOOD BY AUTOMATED COUNT: 12 % (ref 11.5–14.5)
GLOBULIN SER CALC-MCNC: 3 G/DL (ref 2–4)
GLUCOSE SERPL-MCNC: 84 MG/DL (ref 65–100)
HCT VFR BLD AUTO: 40 % (ref 35–47)
HDLC SERPL-MCNC: 72 MG/DL
HDLC SERPL: 3.1 {RATIO} (ref 0–5)
HGB BLD-MCNC: 13.3 G/DL (ref 11.5–16)
LDLC SERPL CALC-MCNC: 129.6 MG/DL (ref 0–100)
LIPID PROFILE,FLP: ABNORMAL
MCH RBC QN AUTO: 31.4 PG (ref 26–34)
MCHC RBC AUTO-ENTMCNC: 33.3 G/DL (ref 30–36.5)
MCV RBC AUTO: 94.6 FL (ref 80–99)
NRBC # BLD: 0 K/UL (ref 0–0.01)
NRBC BLD-RTO: 0 PER 100 WBC
PLATELET # BLD AUTO: 176 K/UL (ref 150–400)
PMV BLD AUTO: 12.6 FL (ref 8.9–12.9)
POTASSIUM SERPL-SCNC: 4.4 MMOL/L (ref 3.5–5.1)
PROT SERPL-MCNC: 6.9 G/DL (ref 6.4–8.2)
RBC # BLD AUTO: 4.23 M/UL (ref 3.8–5.2)
SODIUM SERPL-SCNC: 138 MMOL/L (ref 136–145)
TRIGL SERPL-MCNC: 92 MG/DL (ref ?–150)
VLDLC SERPL CALC-MCNC: 18.4 MG/DL
WBC # BLD AUTO: 6.2 K/UL (ref 3.6–11)

## 2021-02-03 PROCEDURE — 99396 PREV VISIT EST AGE 40-64: CPT | Performed by: INTERNAL MEDICINE

## 2021-02-03 RX ORDER — BRAN/GUM/FIB/CEL/PSYL/KELP/PEC 1000 MG
TABLET ORAL
COMMUNITY

## 2021-02-03 NOTE — PROGRESS NOTES
Dajuan Pratt is a 52 y.o. female  Presenting for her annual checkup and follow-up    Right epistaxis. Intermittent . Last episode 2 weeks ago. Seeing Dr. Ramo Pretty for GYN. Occasional palpitations. Rare pre-syncope, very rare. Father w hx of heart attack, had afib ablation. EKG in  showed a short AK interval.  No other previous cardiac work-up. Health Maintenance   Topic Date Due    PAP AKA CERVICAL CYTOLOGY  2023    Lipid Screen  2025    DTaP/Tdap/Td series (2 - Td) 2026    Flu Vaccine  Completed    Pneumococcal 0-64 years  Aged Out       Exercise: very active  Started Noom diet since 2020. Diet: generally follows a low fat low cholesterol diet    Vaccinations reviewed  Immunization History   Administered Date(s) Administered    Influenza Vaccine 10/01/2015, 2017, 10/04/2018, 10/01/2019, 10/01/2020    Tdap 2016       Allergies: Vicodin [hydrocodone-acetaminophen]  Current Outpatient Medications   Medication Sig    bran-gum-fib-laurie-psyl-kelp-pec (Fiber 6) 1,000 mg tab Take  by mouth. Fiber tablet. No current facility-administered medications for this visit. has a past medical history of Atypical nevus, Eustachian tube dysfunction, Heart palpitations (2017), House dust mite allergy (), Nephrolithiasis (), Pap smear for cervical cancer screening (2015), Scoliosis, Shortened AK interval (2017), and Vertigo (2016).   Past Surgical History:   Procedure Laterality Date    HX APPENDECTOMY  2020    HX  SECTION  2002    HX TONSILLECTOMY  1976      Social History     Socioeconomic History    Marital status:      Spouse name: Denzel Goncalves Number of children: 1    Years of education: Not on file    Highest education level: Not on file   Occupational History    Occupation:  [employer]     Employer: [employer]   Social Needs    Financial resource strain: Not on file    Food insecurity     Worry: Not on file     Inability: Not on file    Transportation needs     Medical: Not on file     Non-medical: Not on file   Tobacco Use    Smoking status: Never Smoker    Smokeless tobacco: Never Used   Substance and Sexual Activity    Alcohol use: Yes     Comment: one drink per week    Drug use: Not on file    Sexual activity: Yes   Lifestyle    Physical activity     Days per week: Not on file     Minutes per session: Not on file    Stress: Not on file   Relationships    Social connections     Talks on phone: Not on file     Gets together: Not on file     Attends Congregational service: Not on file     Active member of club or organization: Not on file     Attends meetings of clubs or organizations: Not on file     Relationship status: Not on file    Intimate partner violence     Fear of current or ex partner: Not on file     Emotionally abused: Not on file     Physically abused: Not on file     Forced sexual activity: Not on file   Other Topics Concern    Not on file   Social History Narrative    1 daughter (senior) - going to [employer] 2020     Family History   Problem Relation Age of Onset    Cancer Maternal Grandmother 72        gallbladder?  Heart Attack Maternal Grandfather 76    Prostate Cancer Paternal Grandfather     Coronary Artery Disease Father 67    Arrhythmia Father         ablation    Diabetes Neg Hx     Hypertension Neg Hx     High Cholesterol Neg Hx        Review of Systems - History obtained from the patient  General ROS: negative for - night sweats, weight gain or weight loss  Cardiovascular ROS: no chest pain, dyspnea on exertion, edema  GYN ROS: normal menses, no abnormal bleeding, pelvic pain or discharge, no breast pain or new or enlarging lumps on self exam.    Physical exam  Blood pressure 115/75, pulse 68, temperature 97.7 °F (36.5 °C), temperature source Oral, resp. rate 14, height 5' (1.524 m), weight 127 lb (57.6 kg), last menstrual period 01/24/2021, SpO2 100 %.   Wt Readings from Last 3 Encounters:   02/03/21 127 lb (57.6 kg)   03/04/20 135 lb (61.2 kg)   01/29/20 134 lb 6.4 oz (61 kg)     she appears well, alert and oriented x 3, pleasant and cooperative. Vitals as noted. Right nasal passage with no obvious evidence of recent bleeding. No rashes or significant lesions. Neck supple and free of adenopathy, or masses. No thyromegaly or carotid bruits. Cranial nerves normal. Lungs are clear to auscultation. Heart sounds are normal with no murmurs, clicks, gallops or rubs. Abdomen is soft, non- tender, with no masses or organomegaly. Extremities, peripheral pulses and reflexes are normal.  . BREAST EXAM: none  PELVIC EXAM: examination not indicated      Diagnoses and all orders for this visit:    1. Preventative health care  She is up-to-date on preventative care. Follow-up with Gyn as indicated. -     LIPID PANEL; Future  -     CBC W/O DIFF; Future  -     METABOLIC PANEL, COMPREHENSIVE; Future    2. Heart palpitations  Intermittent mild palpitations. Caffeine reduction recommended. Monitor for now. Could consider Holter monitor or referral to cardiology since she may need a more prolonged monitor to  these symptoms.         The patient is asked to make an attempt to improve diet and exercise patterns    Return for yearly wellness visits

## 2021-04-08 ENCOUNTER — TELEPHONE (OUTPATIENT)
Dept: INTERNAL MEDICINE CLINIC | Age: 51
End: 2021-04-08

## 2021-04-09 NOTE — TELEPHONE ENCOUNTER
Patient called reporting white cottage-cheese like vaginal discharge and increased bladder pressure. No fever, chills, nausea, vomiting, dysuria (although she reports that she just developed the bladder pain and hadn't yet urinated by the time she called me). Offered fluconazole to be sent yesterday and a Friday appt, but pt was concerned about it getting worse overnight. Therefore, advised in person eval at urgent care.

## 2021-08-25 NOTE — PROGRESS NOTES
Carolina Campos (: 1970) is a 54 y.o. female, established patient, here for evaluation of the following chief complaint(s):  Flank Pain (L side) and Knee Pain (R knee)       ASSESSMENT/PLAN:  Below is the assessment and plan developed based on review of pertinent history, physical exam, labs, studies, and medications. 1. Chronic pain of right knee  I believe that her sx are due to misalignment of her tendon. I am not confident that her injury in meniscal in nature. Since her pain is not exacerbated by walking, I do not feel like its arthritic. Since her pain is effected by position, I believe that she has something impeding her flexibility. I will refer her to PT. 2. LLQ abdominal pain  I believe that the pt experienced constipation. I encouraged her to hydrate adequately to compensate for the dehydration that tea causes. No follow-ups on file. SUBJECTIVE/OBJECTIVE:  HPI     R Knee Pain: Pt states that she has inner R knee pain for approximately 8 months. She notes that her pain is exacerbated by kneeling or bending and alleviated by stretching. Of note, the pt walks for 30-45 minutes approximately 5 days/week and regularly changes running shoes. She denies pain while walking or taking the steps. Pt denies fever, chills, or warmth around the area. Abdominal Pain: Pt notes LLQ pain earlier in the week that has resolved herself. She expresses concern about the cause of this pain. Pt describes her initial pain as \"like the organs were bruised, slight, and subtle. \" She notes that her sx progressed and she felt sore in the area. Of note, she was constipated over the weekend and her sx have subsided since she emptied her bowels. Pt reports that her stools have been loose. She notes that she drinks water, but drinks much more caffeinated tea. Review of Systems   Gastrointestinal: Positive for abdominal pain and constipation. Musculoskeletal: Positive for myalgias.    All other systems reviewed and are negative. Physical Exam  Constitutional:       Appearance: Normal appearance. HENT:      Right Ear: Tympanic membrane and external ear normal.      Left Ear: Tympanic membrane and external ear normal.      Mouth/Throat:      Mouth: Mucous membranes are moist.      Pharynx: Oropharynx is clear. Cardiovascular:      Rate and Rhythm: Normal rate and regular rhythm. Pulses: Normal pulses. Heart sounds: Normal heart sounds. Pulmonary:      Effort: Pulmonary effort is normal.      Breath sounds: Normal breath sounds. Musculoskeletal:         General: Normal range of motion. Skin:     General: Skin is warm and dry. Neurological:      General: No focal deficit present. Mental Status: She is alert and oriented to person, place, and time. Psychiatric:         Mood and Affect: Mood normal.         Behavior: Behavior normal.       On this date 08/26/2021 I have spent 20 minutes reviewing previous notes, test results and face to face with the patient discussing the diagnosis and importance of compliance with the treatment plan as well as documenting on the day of the visit. An electronic signature was used to authenticate this note. Written by Saulo Hernandez as dictated by Dr. Regina Dickerson.    -- Saulo Hernandez

## 2021-08-26 ENCOUNTER — OFFICE VISIT (OUTPATIENT)
Dept: INTERNAL MEDICINE CLINIC | Age: 51
End: 2021-08-26
Payer: COMMERCIAL

## 2021-08-26 VITALS
SYSTOLIC BLOOD PRESSURE: 103 MMHG | BODY MASS INDEX: 25.09 KG/M2 | DIASTOLIC BLOOD PRESSURE: 69 MMHG | TEMPERATURE: 98 F | HEART RATE: 74 BPM | OXYGEN SATURATION: 97 % | HEIGHT: 60 IN | RESPIRATION RATE: 16 BRPM | WEIGHT: 127.8 LBS

## 2021-08-26 DIAGNOSIS — G89.29 CHRONIC PAIN OF RIGHT KNEE: Primary | ICD-10-CM

## 2021-08-26 DIAGNOSIS — R10.32 LLQ ABDOMINAL PAIN: ICD-10-CM

## 2021-08-26 DIAGNOSIS — M25.561 CHRONIC PAIN OF RIGHT KNEE: Primary | ICD-10-CM

## 2021-08-26 PROCEDURE — 99213 OFFICE O/P EST LOW 20 MIN: CPT | Performed by: INTERNAL MEDICINE

## 2021-08-26 RX ORDER — ASCORBIC ACID 500 MG
500 TABLET ORAL DAILY
COMMUNITY

## 2021-10-04 ENCOUNTER — TRANSCRIBE ORDER (OUTPATIENT)
Dept: SCHEDULING | Age: 51
End: 2021-10-04

## 2021-10-04 DIAGNOSIS — Z12.31 VISIT FOR SCREENING MAMMOGRAM: Primary | ICD-10-CM

## 2021-11-09 ENCOUNTER — HOSPITAL ENCOUNTER (OUTPATIENT)
Dept: MAMMOGRAPHY | Age: 51
Discharge: HOME | End: 2021-11-09
Attending: OBSTETRICS & GYNECOLOGY
Payer: COMMERCIAL

## 2021-11-09 DIAGNOSIS — Z12.31 VISIT FOR SCREENING MAMMOGRAM: ICD-10-CM

## 2021-11-09 PROCEDURE — 77067 SCR MAMMO BI INCL CAD: CPT

## 2022-03-19 PROBLEM — R00.2 HEART PALPITATIONS: Status: ACTIVE | Noted: 2017-11-13

## 2022-03-19 PROBLEM — R94.31 SHORTENED PR INTERVAL: Status: ACTIVE | Noted: 2017-11-13

## 2022-03-20 PROBLEM — K35.80 APPENDICITIS, ACUTE: Status: ACTIVE | Noted: 2020-03-04

## 2022-03-22 ENCOUNTER — OFFICE VISIT (OUTPATIENT)
Dept: INTERNAL MEDICINE CLINIC | Age: 52
End: 2022-03-22
Payer: COMMERCIAL

## 2022-03-22 ENCOUNTER — TELEPHONE (OUTPATIENT)
Dept: INTERNAL MEDICINE CLINIC | Age: 52
End: 2022-03-22

## 2022-03-22 VITALS
HEIGHT: 60 IN | TEMPERATURE: 98.1 F | BODY MASS INDEX: 25.29 KG/M2 | HEART RATE: 70 BPM | OXYGEN SATURATION: 99 % | WEIGHT: 128.8 LBS | SYSTOLIC BLOOD PRESSURE: 109 MMHG | RESPIRATION RATE: 16 BRPM | DIASTOLIC BLOOD PRESSURE: 75 MMHG

## 2022-03-22 DIAGNOSIS — Z00.00 PREVENTATIVE HEALTH CARE: Primary | ICD-10-CM

## 2022-03-22 DIAGNOSIS — Z12.11 SCREEN FOR COLON CANCER: ICD-10-CM

## 2022-03-22 DIAGNOSIS — L65.9 HAIR THINNING: ICD-10-CM

## 2022-03-22 DIAGNOSIS — Z11.59 NEED FOR HEPATITIS C SCREENING TEST: ICD-10-CM

## 2022-03-22 PROCEDURE — 99396 PREV VISIT EST AGE 40-64: CPT | Performed by: INTERNAL MEDICINE

## 2022-03-22 RX ORDER — ZOSTER VACCINE RECOMBINANT, ADJUVANTED 50 MCG/0.5
0.5 KIT INTRAMUSCULAR ONCE
Qty: 0.5 ML | Refills: 1 | Status: SHIPPED | OUTPATIENT
Start: 2022-03-22 | End: 2022-03-22

## 2022-03-22 NOTE — TELEPHONE ENCOUNTER
Per PCP, medical record request sent via [employer] to Medical Records for Dr. Balbir Couch for a copy of patient's most recent pap smear result report.   [employer] fax results report shows a successful transmission of the medical record request.

## 2022-03-22 NOTE — PROGRESS NOTES
Tootie Harrell is a 50 y.o. female  Presenting for her annual checkup and follow-up  She is doing fairly well. 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 Has 1 biologic daughter at [place] and 5 stepchildren. 1 is graduating from high school soon. Intermittent R abdominal pains. Hx appendectomy. It is relatively mild. Denies any severe symptoms. No associated with eating. No constipation. No hx colonoscopy. Occasional urinary urgency. Usually resolves within a day. Currently asymptomatic. No vaginal discharge. She does drink a lot of tea. Back feels \"curved\". She used to do yoga but has recently stopped. She is concerned about her hair thinning some. No history of iron deficiency or thyroid disease known but she is still having regular menstrual periods.     Last breast exam: Dr. Hebert  Last PAP/pelvic: fall 2021 Dr. Hebert  Last colonoscopy: none  Last DEXA:   Health Maintenance   Topic Date Due    Hepatitis C Screening  Never done    Cervical cancer screen  Never done    Colorectal Cancer Screening Combo  Never done    Shingrix Vaccine Age 50> (1 of 2) Never done    COVID-19 Vaccine (3 - Booster for Pfizer series) 02/09/2022    Depression Screen  03/22/2023    Breast Cancer Screen Mammogram  11/09/2023    Lipid Screen  02/03/2026    DTaP/Tdap/Td series (2 - Td or Tdap) 09/21/2026    Flu Vaccine  Completed    Pneumococcal 0-64 years  Aged Out       Exercise: moderately active  Diet: generally follows a low fat low cholesterol diet    Vaccinations reviewed  Immunization History   Administered Date(s) Administered    COVID-19, Pfizer Purple top, DILUTE for use, 12+ yrs, 30mcg/0.3mL dose 08/19/2021, 09/09/2021    Influenza Vaccine 10/01/2015, 09/29/2017, 10/04/2018, 10/01/2019, 10/01/2020, 10/12/2021    Tdap 09/21/2016       Allergies: Vicodin [hydrocodone-acetaminophen]  Current Outpatient Medications   Medication Sig    pumpkin seed/soy germ/Cissus (PUMPKIN SEED-SOYBEAN OIL-CISS PO) Take  by mouth.  Shingrix, PF, 50 mcg/0.5 mL susr injection 0.5 mL by IntraMUSCular route once for 1 dose. Receive 2nd dose in 2-6 months. For Shingles (Zoster) prevention    ascorbic acid, vitamin C, (Vitamin C) 500 mg tablet Take 500 mg by mouth daily.  bran-gum-fib-laurie-psyl-kelp-pec (Fiber 6) 1,000 mg tab Take  by mouth. Fiber tablet. No current facility-administered medications for this visit. has a past medical history of Appendicitis, acute (3/4/2020), Atypical nevus, Eustachian tube dysfunction, Heart palpitations (2017), House dust mite allergy (), Nephrolithiasis (), Pap smear for cervical cancer screening (2015), Scoliosis, Shortened [place] interval (2017), and Vertigo (2016). Past Surgical History:   Procedure Laterality Date    HX APPENDECTOMY  2020    HX  SECTION      HX TONSILLECTOMY        Social History     Socioeconomic History    Marital status:      Spouse name: Barbara Ulloa Number of children: 1    Years of education: Not on file    Highest education level: Not on file   Occupational History    Occupation:  [employer]     Employer: [employer]   Tobacco Use    Smoking status: Never Smoker    Smokeless tobacco: Never Used   Substance and Sexual Activity    Alcohol use: Yes     Comment: one drink per week    Drug use: Not on file    Sexual activity: Yes   Other Topics Concern    Not on file   Social History Narrative    1 daughter (senior) - going to [employer] 2020     Social Determinants of Health     Financial Resource Strain:     Difficulty of Paying Living Expenses: Not on file   Food Insecurity:     Worried About [address] in the Last Year: Not on file    [address] in the Last Year: Not on file   Transportation Needs:     Lack of Transportation (Medical): Not on file    Lack of Transportation (Non-Medical):  Not on file   Physical Activity:     Days of Exercise per Week: Not on file    Minutes of Exercise per Session: Not on file   Stress:     Feeling of Stress : Not on file   Social Connections:     Frequency of Communication with Friends and Family: Not on file    Frequency of Social Gatherings with Friends and Family: Not on file    Attends Confucianism Services: Not on file    Active Member of [address] [place] [address] [place] or Organizations: Not on file    Attends Club or Organization Meetings: Not on file    Marital Status: Not on file   Intimate Partner Violence:     Fear of Current or Ex-Partner: Not on file    Emotionally Abused: Not on file    Physically Abused: Not on file    Sexually Abused: Not on file   Housing Stability:     Unable to Pay for Housing in the Last Year: Not on file    Number of [place] in the Last Year: Not on file    Unstable Housing in the Last Year: Not on file     Family History   Problem Relation Age of Onset    Cancer Maternal Grandmother 72        gallbladder?  Heart Attack Maternal Grandfather 76    Prostate Cancer Paternal Grandfather     Coronary Art Dis Father 67    Arrhythmia Father         ablation    Diabetes Neg Hx     Hypertension Neg Hx     High Cholesterol Neg Hx        Review of Systems - History obtained from the patient  General ROS: negative for - night sweats, weight gain or weight loss  Cardiovascular ROS: no chest pain, dyspnea on exertion, edema  GYN ROS: normal menses, no abnormal bleeding, pelvic pain or discharge, no breast pain or new or enlarging lumps on self exam.    Physical exam  Blood pressure 109/75, pulse 70, temperature 98.1 °F (36.7 °C), temperature source Oral, resp. rate 16, height 5' (1.524 m), weight 128 lb 12.8 oz (58.4 kg), last menstrual period 03/04/2022, SpO2 99 %. Wt Readings from Last 3 Encounters:   03/22/22 128 lb 12.8 oz (58.4 kg)   08/26/21 127 lb 12.8 oz (58 kg)   02/03/21 127 lb (57.6 kg)     she appears well, alert and oriented x 3, pleasant and cooperative. Vitals as noted.    No rashes or significant lesions. Neck supple and free of adenopathy, or masses. No thyromegaly or carotid bruits. Cranial nerves normal. Lungs are clear to auscultation. Heart sounds are normal with no murmurs, clicks, gallops or rubs. Abdomen is soft, non- tender, with no masses or organomegaly. Extremities, peripheral pulses and reflexes are normal.  . BREAST EXAM: not examined  PELVIC EXAM: examination not indicated      Diagnoses and all orders for this visit:    1. Preventative health care  We will get her Pap smear results from Dr. Cedrick Ceron.  Recommend Shingrix vaccination. Recommend colonoscopy  Recommend COVID-19 booster. She is reluctant.  -     LIPID PANEL; Future  -     CBC W/O DIFF; Future  -     METABOLIC PANEL, COMPREHENSIVE; Future  -     Shingrix, PF, 50 mcg/0.5 mL susr injection; 0.5 mL by IntraMUSCular route once for 1 dose. Receive 2nd dose in 2-6 months. For Shingles (Zoster) prevention    2. Need for hepatitis C screening test  -     HEPATITIS C AB; Future    3. Screen for colon cancer  -     REFERRAL TO GASTROENTEROLOGY    4. Hair thinning  Mild. Continues to have menstrual periods. Start iron supplement and check levels. Thyroid function in the past has been normal  -     FERRITIN; Future  -     ferrous sulfate (SLOW FE) 142 mg (45 mg iron) ER tablet; Take 1 Tablet by mouth Daily (before breakfast).         The patient is asked to make an attempt to improve diet and exercise patterns    Return for yearly wellness visits

## 2022-03-23 LAB
ALBUMIN SERPL-MCNC: 4 G/DL (ref 3.5–5)
ALBUMIN/GLOB SERPL: 1.3 {RATIO} (ref 1.1–2.2)
ALP SERPL-CCNC: 63 U/L (ref 45–117)
ALT SERPL-CCNC: 28 U/L (ref 12–78)
ANION GAP SERPL CALC-SCNC: 2 MMOL/L (ref 5–15)
AST SERPL-CCNC: 17 U/L (ref 15–37)
BILIRUB SERPL-MCNC: 0.4 MG/DL (ref 0.2–1)
BUN SERPL-MCNC: 15 MG/DL (ref 6–20)
BUN/CREAT SERPL: 19 (ref 12–20)
CALCIUM SERPL-MCNC: 9.2 MG/DL (ref 8.5–10.1)
CHLORIDE SERPL-SCNC: 108 MMOL/L (ref 97–108)
CHOLEST SERPL-MCNC: 212 MG/DL
CO2 SERPL-SCNC: 28 MMOL/L (ref 21–32)
CREAT SERPL-MCNC: 0.8 MG/DL (ref 0.55–1.02)
ERYTHROCYTE [DISTWIDTH] IN BLOOD BY AUTOMATED COUNT: 12.2 % (ref 11.5–14.5)
FERRITIN SERPL-MCNC: 83 NG/ML (ref 26–388)
GLOBULIN SER CALC-MCNC: 3.2 G/DL (ref 2–4)
GLUCOSE SERPL-MCNC: 78 MG/DL (ref 65–100)
HCT VFR BLD AUTO: 40.5 % (ref 35–47)
HCV AB SERPL QL IA: NONREACTIVE
HDLC SERPL-MCNC: 70 MG/DL
HDLC SERPL: 3 {RATIO} (ref 0–5)
HGB BLD-MCNC: 13.3 G/DL (ref 11.5–16)
LDLC SERPL CALC-MCNC: 121.2 MG/DL (ref 0–100)
MCH RBC QN AUTO: 32.2 PG (ref 26–34)
MCHC RBC AUTO-ENTMCNC: 32.8 G/DL (ref 30–36.5)
MCV RBC AUTO: 98.1 FL (ref 80–99)
NRBC # BLD: 0 K/UL (ref 0–0.01)
NRBC BLD-RTO: 0 PER 100 WBC
PLATELET # BLD AUTO: 184 K/UL (ref 150–400)
PMV BLD AUTO: 12.8 FL (ref 8.9–12.9)
POTASSIUM SERPL-SCNC: 4.4 MMOL/L (ref 3.5–5.1)
PROT SERPL-MCNC: 7.2 G/DL (ref 6.4–8.2)
RBC # BLD AUTO: 4.13 M/UL (ref 3.8–5.2)
SODIUM SERPL-SCNC: 138 MMOL/L (ref 136–145)
TRIGL SERPL-MCNC: 104 MG/DL (ref ?–150)
VLDLC SERPL CALC-MCNC: 20.8 MG/DL
WBC # BLD AUTO: 6.7 K/UL (ref 3.6–11)

## 2022-08-24 ENCOUNTER — OFFICE VISIT (OUTPATIENT)
Dept: INTERNAL MEDICINE CLINIC | Age: 52
End: 2022-08-24
Payer: COMMERCIAL

## 2022-08-24 VITALS
SYSTOLIC BLOOD PRESSURE: 109 MMHG | BODY MASS INDEX: 25.4 KG/M2 | OXYGEN SATURATION: 98 % | HEIGHT: 60 IN | DIASTOLIC BLOOD PRESSURE: 74 MMHG | HEART RATE: 76 BPM | TEMPERATURE: 97.7 F | WEIGHT: 129.4 LBS | RESPIRATION RATE: 14 BRPM

## 2022-08-24 DIAGNOSIS — M62.838 TRAPEZIUS MUSCLE SPASM: Primary | ICD-10-CM

## 2022-08-24 DIAGNOSIS — M79.10 MYALGIA: ICD-10-CM

## 2022-08-24 DIAGNOSIS — R25.2 LEG CRAMPS: ICD-10-CM

## 2022-08-24 PROCEDURE — 99213 OFFICE O/P EST LOW 20 MIN: CPT | Performed by: NURSE PRACTITIONER

## 2022-08-24 RX ORDER — METHYLPREDNISOLONE 4 MG/1
TABLET ORAL
Qty: 1 DOSE PACK | Refills: 0 | Status: SHIPPED | OUTPATIENT
Start: 2022-08-24 | End: 2022-09-02 | Stop reason: ALTCHOICE

## 2022-08-24 RX ORDER — LORATADINE 10 MG/1
10 TABLET ORAL
COMMUNITY

## 2022-08-24 RX ORDER — METHOCARBAMOL 500 MG/1
500 TABLET, FILM COATED ORAL
Qty: 30 TABLET | Refills: 0 | Status: SHIPPED | OUTPATIENT
Start: 2022-08-24

## 2022-08-25 ENCOUNTER — PATIENT MESSAGE (OUTPATIENT)
Dept: INTERNAL MEDICINE CLINIC | Age: 52
End: 2022-08-25

## 2022-08-25 LAB
ANION GAP SERPL CALC-SCNC: 5 MMOL/L (ref 5–15)
BUN SERPL-MCNC: 23 MG/DL (ref 6–20)
BUN/CREAT SERPL: 29 (ref 12–20)
CALCIUM SERPL-MCNC: 9.7 MG/DL (ref 8.5–10.1)
CHLORIDE SERPL-SCNC: 107 MMOL/L (ref 97–108)
CO2 SERPL-SCNC: 28 MMOL/L (ref 21–32)
COMMENT, HOLDF: NORMAL
CREAT SERPL-MCNC: 0.8 MG/DL (ref 0.55–1.02)
GLUCOSE SERPL-MCNC: 86 MG/DL (ref 65–100)
MAGNESIUM SERPL-MCNC: 2.3 MG/DL (ref 1.6–2.4)
POTASSIUM SERPL-SCNC: 4.3 MMOL/L (ref 3.5–5.1)
SAMPLES BEING HELD,HOLD: NORMAL
SODIUM SERPL-SCNC: 140 MMOL/L (ref 136–145)

## 2022-08-25 NOTE — PROGRESS NOTES
Velma Patino (: 1970) is a 50 y.o. female, established patient, here for evaluation of the following chief complaint(s):  Leg Pain (Pain in both legs, not muscular, ibuprofen helps)       ASSESSMENT/PLAN:  Below is the assessment and plan developed based on review of pertinent history, physical exam, labs, studies, and medications. 1. Trapezius muscle spasm -- stretching exercises given  -     methocarbamoL (ROBAXIN) 500 mg tablet; Take 1 Tablet by mouth four (4) times daily as needed for Muscle Spasm(s). , Normal, Disp-30 Tablet, R-0  -     methylPREDNISolone (MEDROL DOSEPACK) 4 mg tablet; Take as directed  with meals, Print, Disp-1 Dose Pack, R-0    2. Leg cramps  -     methocarbamoL (ROBAXIN) 500 mg tablet; Take 1 Tablet by mouth four (4) times daily as needed for Muscle Spasm(s). , Normal, Disp-30 Tablet, R-0  -     METABOLIC PANEL, BASIC; Future  -     MAGNESIUM; Future    3. Myalgia -- advised to stay well hydrated and stretching exercises given  -     methocarbamoL (ROBAXIN) 500 mg tablet; Take 1 Tablet by mouth four (4) times daily as needed for Muscle Spasm(s). , Normal, Disp-30 Tablet, R-0  -     methylPREDNISolone (MEDROL DOSEPACK) 4 mg tablet; Take as directed  with meals, Print, Disp-1 Dose Pack, R-0        SUBJECTIVE/OBJECTIVE:  HPI    Patient of Dr Betty Lubin presents with complaints of aching sensation, decreased sensitivity to bilateral lateral calves for the past 3 days. Symptoms have been intermittent and has responded to Ibuprofen. Has been having muscle cramping in legs recently. Has been noting tightness and tenderness to upper back muscles especially when stressed. Denies radiation of pain into arms/hands.     Patient Active Problem List   Diagnosis Code    Eustachian tube dysfunction H69.80    House dust mite allergy Z91.09    Shortened [place] interval R94.31    Heart palpitations R00.2     Past Surgical History:   Procedure Laterality Date    HX APPENDECTOMY  2020    HX  SECTION  2002    HX TONSILLECTOMY  1976     Social History     Socioeconomic History    Marital status:      Spouse name: Annel Brannon Number of children: 1    Years of education: Not on file    Highest education level: Not on file   Occupational History    Occupation:  [employer]     Employer: [employer]   Tobacco Use    Smoking status: Never    Smokeless tobacco: Never   Substance and Sexual Activity    Alcohol use: Yes     Comment: one drink per week    Drug use: Not on file    Sexual activity: Yes   Other Topics Concern    Not on file   Social History Narrative    1 daughter (senior) - going to [employer] 2020     Social Determinants of Health     Financial Resource Strain: Not on file   Food Insecurity: Not on file   Transportation Needs: Not on file   Physical Activity: Not on file   Stress: Not on file   Social Connections: Not on file   Intimate Partner Violence: Not on file   Housing Stability: Not on file     Family History   Problem Relation Age of Onset    Cancer Maternal Grandmother 72        gallbladder?  Heart Attack Maternal Grandfather 76    Prostate Cancer Paternal Grandfather     Coronary Art Dis Father 67    Arrhythmia Father         ablation    Diabetes Neg Hx     Hypertension Neg Hx     High Cholesterol Neg Hx      Current Outpatient Medications   Medication Sig    loratadine (CLARITIN) 10 mg tablet Take 10 mg by mouth.  methocarbamoL (ROBAXIN) 500 mg tablet Take 1 Tablet by mouth four (4) times daily as needed for Muscle Spasm(s).  methylPREDNISolone (MEDROL DOSEPACK) 4 mg tablet Take as directed  with meals    ascorbic acid, vitamin C, (VITAMIN C) 500 mg tablet Take 500 mg by mouth daily.  bran-gum-fib-laurie-psyl-kelp-pec (Fiber 6) 1,000 mg tab Take  by mouth. Fiber tablet. No current facility-administered medications for this visit.      Allergies   Allergen Reactions    Vicodin [Hydrocodone-Acetaminophen] Other (comments) and Vertigo     Feels terrible all over-dizziness, shaking, severe nausea     Immunization History   Administered Date(s) Administered    COVID-19, PFIZER PURPLE top, DILUTE for use, (age 15 y+), IM, 30mcg/0.3mL 08/19/2021, 09/09/2021    Influenza Vaccine 10/01/2015, 09/29/2017, 10/04/2018, 10/01/2019, 10/01/2020, 10/12/2021, 10/12/2021    Tdap 09/21/2016    Zoster Recombinant 04/01/2022        Review of Systems   Constitutional:  Negative for chills and fever. Respiratory:  Negative for cough and shortness of breath. Genitourinary:  Negative for frequency. Musculoskeletal:  Positive for myalgias. Neurological:  Positive for numbness. Negative for dizziness, facial asymmetry and headaches. Physical Exam  Vitals and nursing note reviewed. Constitutional:       Appearance: Normal appearance. HENT:      Head: Normocephalic. Cardiovascular:      Rate and Rhythm: Normal rate and regular rhythm. Pulmonary:      Effort: Pulmonary effort is normal.      Breath sounds: Normal breath sounds. Musculoskeletal:      Cervical back: Normal range of motion and neck supple. Spasms and tenderness present. Back:         Legs:       Comments: Area of diminished sensation as depicted   Skin:     General: Skin is warm and dry. Findings: No erythema or rash. Neurological:      General: No focal deficit present. Mental Status: She is alert and oriented to person, place, and time. Sensory: Sensory deficit present. Psychiatric:         Mood and Affect: Mood normal.         Behavior: Behavior normal.           An electronic signature was used to authenticate this note.   -- Shabnam Olea NP

## 2022-09-02 ENCOUNTER — TELEPHONE (OUTPATIENT)
Dept: INTERNAL MEDICINE CLINIC | Age: 52
End: 2022-09-02

## 2022-09-02 ENCOUNTER — OFFICE VISIT (OUTPATIENT)
Dept: INTERNAL MEDICINE CLINIC | Age: 52
End: 2022-09-02
Payer: COMMERCIAL

## 2022-09-02 VITALS
OXYGEN SATURATION: 98 % | RESPIRATION RATE: 16 BRPM | SYSTOLIC BLOOD PRESSURE: 116 MMHG | TEMPERATURE: 98.4 F | HEART RATE: 82 BPM | BODY MASS INDEX: 25.36 KG/M2 | DIASTOLIC BLOOD PRESSURE: 73 MMHG | HEIGHT: 60 IN | WEIGHT: 129.2 LBS

## 2022-09-02 DIAGNOSIS — R10.2 SUPRAPUBIC PAIN, ACUTE: Primary | ICD-10-CM

## 2022-09-02 LAB
BILIRUB UR QL STRIP: NEGATIVE
GLUCOSE UR-MCNC: NEGATIVE MG/DL
KETONES P FAST UR STRIP-MCNC: NEGATIVE MG/DL
PH UR STRIP: 7 [PH] (ref 4.6–8)
PROT UR QL STRIP: NEGATIVE
SP GR UR STRIP: 1.01 (ref 1–1.03)
UA UROBILINOGEN AMB POC: NORMAL (ref 0.2–1)
URINALYSIS CLARITY POC: NORMAL
URINALYSIS COLOR POC: YELLOW
URINE BLOOD POC: NEGATIVE
URINE LEUKOCYTES POC: NORMAL
URINE NITRITES POC: NEGATIVE

## 2022-09-02 PROCEDURE — 99213 OFFICE O/P EST LOW 20 MIN: CPT | Performed by: INTERNAL MEDICINE

## 2022-09-02 PROCEDURE — 81003 URINALYSIS AUTO W/O SCOPE: CPT | Performed by: INTERNAL MEDICINE

## 2022-09-02 RX ORDER — CEPHALEXIN 500 MG/1
500 CAPSULE ORAL 3 TIMES DAILY
Qty: 21 CAPSULE | Refills: 0 | Status: SHIPPED | OUTPATIENT
Start: 2022-09-02

## 2022-09-02 RX ORDER — LUTEIN 6 MG
TABLET ORAL DAILY
COMMUNITY

## 2022-09-02 RX ORDER — GLUCOSAMINE/CHONDR SU A SOD 750-600 MG
TABLET ORAL DAILY
COMMUNITY

## 2022-09-02 NOTE — TELEPHONE ENCOUNTER
Spoke with patient and she reports bladder discomfort. No difficulty urinating. Tenderness. She believes she may have a UTI. She is requesting an appointment. No appts available with Dr. Moseley. Scheduled with Dr. Benigno Banegas at [address] AM. Grateful for the call.

## 2022-09-02 NOTE — TELEPHONE ENCOUNTER
Spoke with patient and advised that her previously schedule appt was cancelled per Dr. Gamal Ceja request she is unable to see her today. Advised that Dr. Bogdan Palomino would be updated and that she could expect a rtc. Grateful for the call.

## 2022-09-02 NOTE — PROGRESS NOTES
HISTORY OF PRESENT ILLNESS    Chief Complaint   Patient presents with    Bladder Infection     Bladder discomfort - pain on yesterday but no pain today. Presents with show pubic discomfort since yesterday. States that she had discomfort on both sides in her lower abdomen. Pain was moderate. Had some mild urinary urgency's but denied any dysuria, change in urine color or odor. No flank pain, fever, chills. She took Uristat once yesterday. Today, symptoms seem somewhat improved. She again denies any urinary symptoms. Urinalysis in the office today shows 1+ leukocytes with no nitrates, no blood'  No recent UTI. No vaginal discharge. History of appendectomy. CT scan 2020 showed unremarkable uterus with a small nabothian cyst.    Review of Systems   All other systems reviewed and are negative, except as noted in HPI    Past Medical and Surgical History   has a past medical history of Appendicitis, acute (3/4/2020), Atypical nevus, Eustachian tube dysfunction, Heart palpitations (2017), House dust mite allergy (), Nephrolithiasis (), Pap smear for cervical cancer screening (2015), Scoliosis, Shortened VT interval (2017), and Vertigo (2016). has a past surgical history that includes hx tonsillectomy (); hx  section (); and hx appendectomy (2020). reports that she has never smoked. She has never used smokeless tobacco. She reports current alcohol use.  family history includes Arrhythmia in her father; Cancer (age of onset: 67) in her maternal grandmother; Coronary Art Dis (age of onset: 67) in her father; Heart Attack (age of onset: 76) in her maternal grandfather; Prostate Cancer in her paternal grandfather. Physical Exam   Nursing note and vitals reviewed. Blood pressure 116/73, pulse 82, temperature 98.4 °F (36.9 °C), temperature source Oral, resp.  rate 16, height 5' (1.524 m), weight 129 lb 3.2 oz (58.6 kg), last menstrual period 08/03/2022, SpO2 98 %. Constitutional: In no distress. Eyes: Conjunctivae are normal.  HEENT:  No LAD or thyromegaly   Cardiovascular: Normal rate. regular rhythm. No murmurs  No edema  Pulmonary/Chest: Effort normal. clear to ausculation blaterally  Musculoskeletal:  no edema. Abd:  Soft, mild suprapubic tenderness with no rebound or guarding. Normoactive bowel sounds. Neurological: Alert and oriented. Grossly intact cranial nerves and motor function. Skin: No visible rash noted. Psychiatric: Normal mood and affect. Behavior is normal.     ASSESSMENT and PLAN  1. Suprapubic pain, acute  Mild suprapubic pain for 1 day. Urinalysis is relatively normal with only trace leukocytes. Will monitor for now, but given antibiotics in case symptoms worsen over the holiday weekend. Consider GYN follow-up for exam and pelvic ultrasound. -     AMB POC URINALYSIS DIP STICK AUTO W/O MICRO  -     cephALEXin (KEFLEX) 500 mg capsule; Take 1 Capsule by mouth three (3) times daily. , Print, Disp-21 Capsule, R-0        lab results and schedule of future lab studies reviewed with patient  reviewed medications and side effects in detail    Return to clinic for further evaluation if new symptoms develop or if current symptoms worsen or fail to resolve. There are no Patient Instructions on file for this visit. 111

## 2023-03-31 ENCOUNTER — OFFICE VISIT (OUTPATIENT)
Dept: INTERNAL MEDICINE CLINIC | Age: 53
End: 2023-03-31

## 2023-03-31 VITALS
HEART RATE: 68 BPM | RESPIRATION RATE: 17 BRPM | BODY MASS INDEX: 24.81 KG/M2 | OXYGEN SATURATION: 100 % | HEIGHT: 60 IN | TEMPERATURE: 97.8 F | SYSTOLIC BLOOD PRESSURE: 122 MMHG | DIASTOLIC BLOOD PRESSURE: 78 MMHG | WEIGHT: 126.4 LBS

## 2023-03-31 DIAGNOSIS — L65.9 HAIR THINNING: ICD-10-CM

## 2023-03-31 DIAGNOSIS — R19.8 CHANGE IN BOWEL FUNCTION: ICD-10-CM

## 2023-03-31 DIAGNOSIS — F41.8 SITUATIONAL ANXIETY: ICD-10-CM

## 2023-03-31 DIAGNOSIS — H69.83 DYSFUNCTION OF BOTH EUSTACHIAN TUBES: ICD-10-CM

## 2023-03-31 DIAGNOSIS — R42 VERTIGO: ICD-10-CM

## 2023-03-31 DIAGNOSIS — Z00.00 PREVENTATIVE HEALTH CARE: Primary | ICD-10-CM

## 2023-03-31 DIAGNOSIS — J30.1 SEASONAL ALLERGIC RHINITIS DUE TO POLLEN: ICD-10-CM

## 2023-03-31 RX ORDER — OXYMETAZOLINE HYDROCHLORIDE 0.05 G/100ML
2 SPRAY NASAL 2 TIMES DAILY
Qty: 1 EACH | Refills: 0
Start: 2023-03-31 | End: 2023-04-03

## 2023-03-31 RX ORDER — AZELASTINE 1 MG/ML
1 SPRAY, METERED NASAL 2 TIMES DAILY
Qty: 1 EACH | Refills: 5
Start: 2023-03-31

## 2023-03-31 RX ORDER — BUSPIRONE HYDROCHLORIDE 7.5 MG/1
7.5 TABLET ORAL
Qty: 30 TABLET | Refills: 1 | Status: SHIPPED | OUTPATIENT
Start: 2023-03-31

## 2023-03-31 RX ORDER — MECLIZINE HYDROCHLORIDE 25 MG/1
25 TABLET ORAL
Qty: 30 TABLET | Refills: 2
Start: 2023-03-31 | End: 2023-04-10

## 2023-03-31 NOTE — PROGRESS NOTES
Angelita Arzola is a 48 y.o. female  Presenting for her annual checkup and follow-up     to [facility]. Parents [facility] and [address]. Her mother w recent stroke. Has 1 biologic daughter Rich Bonds at [address]  Has 5 step-kids (Kelley). Works as a  @ [employer]. Vertigo x2.   10/2022, 11/2022. Takes time to recover. Did Epley maneuver. She is concerned about risk during flights. Reports constipation lately. Increased fiber intake and is using tea. Thin stools, sometimes pellets, unformed soft stools at times. Colonoscopy 10/2022. Anxiety. Reports stress re: work, mother w recent stroke. Step son w mental health issues at [facility]. Works 3 days per week. Feels jittery, anxious. Cant calm down. Sleeping well. Denies depression, but feels overwhelmed. Trying to exercise, get outside. Thinking about counseling. Has not taken any meds for this in the past.      Back curvature. Stretching w some relief. Nasal congestion, R epistaxis at times, throat clearing    Traveling to [facility] to [facility] with her daughter later this year. She is concerned about risk of vertigo.     Last breast exam: 11/2021  Last PAP/pelvic: 11/2021 GYN, normal  Last colonoscopy: 10/2022 Dr. Efren Jimenez (no report)  Last DEXA: n/a  Health Maintenance   Topic Date Due    Colorectal Cancer Screening Combo  Never done    COVID-19 Vaccine (3 - Booster for Pfizer series) 11/04/2021    Depression Screen  09/02/2023    Breast Cancer Screen Mammogram  11/09/2023    Cervical cancer screen  11/19/2024    DTaP/Tdap/Td series (2 - Td or Tdap) 09/21/2026    Lipid Screen  03/22/2027    Hepatitis C Screening  Completed    Shingles Vaccine  Completed    Flu Vaccine  Completed    Pneumococcal 0-64 years  Aged Out       Exercise: moderately active  Diet: generally follows a low fat low cholesterol diet    Vaccinations reviewed  Immunization History   Administered Date(s) Administered    COVID-19, PFIZER PURPLE top, DILUTE for use, (age 15 y+), IM, 30mcg/0.3mL 2021, 2021    Influenza Vaccine 10/01/2015, 2017, 10/04/2018, 10/01/2019, 10/01/2020, 10/12/2021, 10/12/2021, 10/26/2022    Tdap 2016    Zoster Recombinant 2022, 2022       Allergies: Vicodin [hydrocodone-acetaminophen]  Current Outpatient Medications   Medication Sig    lutein 20 mg tab Take  by mouth daily. loratadine (CLARITIN) 10 mg tablet Take 10 mg by mouth. ascorbic acid, vitamin C, (VITAMIN C) 500 mg tablet Take 500 mg by mouth daily. bran-gum-fib-laurie-psyl-kelp-pec (Fiber 6) 1,000 mg tab Take  by mouth. Fiber tablet. No current facility-administered medications for this visit. has a past medical history of Appendicitis, acute (3/4/2020), Atypical nevus, Eustachian tube dysfunction, Heart palpitations (2017), House dust mite allergy (), Nephrolithiasis (), Pap smear for cervical cancer screening (2015), Scoliosis, Shortened LA interval (2017), and Vertigo (2016).   Past Surgical History:   Procedure Laterality Date    HX APPENDECTOMY  2020    HX  SECTION  2002    HX TONSILLECTOMY  1976      Social History     Socioeconomic History    Marital status:      Spouse name: Marry Mckee    Number of children: 1    Years of education: Not on file    Highest education level: Not on file   Occupational History    Occupation:  [employer]     Employer: [employer]   Tobacco Use    Smoking status: Never    Smokeless tobacco: Never   Substance and Sexual Activity    Alcohol use: Yes     Comment: one drink per week    Drug use: Not on file    Sexual activity: Yes   Other Topics Concern    Not on file   Social History Narrative    1 daughter (senior) - going to [facility] 2020     Social Determinants of Health     Financial Resource Strain: Low Risk     Difficulty of Paying Living Expenses: Not hard at all   Food Insecurity: No Food Insecurity    Worried About Running Out of Food in the Last Year: Never true    Ran Out of Food in the Last Year: Never true   Transportation Needs: No Transportation Needs    Lack of Transportation (Medical): No    Lack of Transportation (Non-Medical): No   Physical Activity: Sufficiently Active    Days of Exercise per Week: 4 days    Minutes of Exercise per Session: 40 min   Stress: Stress Concern Present    Feeling of Stress : Rather much   Social Connections: Moderately Integrated    Frequency of Communication with Friends and Family: Three times a week    Frequency of Social Gatherings with Friends and Family: Three times a week    Attends Restoration Services: More than 4 times per year    Active Member of Clubs or Organizations: No    Attends Club or Organization Meetings: Never    Marital Status:    Intimate Partner Violence: Not At Risk    Fear of Current or Ex-Partner: No    Emotionally Abused: No    Physically Abused: No    Sexually Abused: No   Housing Stability: Low Risk     Unable to Pay for Housing in the Last Year: No    Number of [place] in the Last Year: 1    Unstable Housing in the Last Year: No     Family History   Problem Relation Age of Onset    Cancer Maternal Grandmother 67        gallbladder? Heart Attack Maternal Grandfather 76    Prostate Cancer Paternal Grandfather     Coronary Art Dis Father 67    Arrhythmia Father         ablation    Diabetes Neg Hx     Hypertension Neg Hx     High Cholesterol Neg Hx        Review of Systems - History obtained from the patient  General ROS: negative for - night sweats, weight gain or weight loss  Cardiovascular ROS: no chest pain, dyspnea on exertion, edema  GYN ROS: no breast pain or new or enlarging lumps on self exam.    Physical exam  Blood pressure 122/78, pulse 68, temperature 97.8 °F (36.6 °C), temperature source Oral, resp. rate 17, height 5' (1.524 m), weight 126 lb 6.4 oz (57.3 kg), last menstrual period 03/28/2023, SpO2 100 %.   Wt Readings from Last 3 Encounters:   03/31/23 126 lb 6.4 oz (57.3 kg)   09/02/22 129 lb 3.2 oz (58.6 kg)   08/24/22 129 lb 6.4 oz (58.7 kg)     she appears well, alert and oriented x 3, pleasant and cooperative. Vitals as noted. No rashes or significant lesions. Neck supple and free of adenopathy, or masses. No thyromegaly or carotid bruits. Cranial nerves normal. Lungs are clear to auscultation. Heart sounds are normal with no murmurs, clicks, gallops or rubs. Abdomen is soft, non- tender, with no masses or organomegaly. Extremities, peripheral pulses and reflexes are normal.  . BREAST EXAM: not examined  PELVIC EXAM: examination not indicated      Diagnoses and all orders for this visit:    1. Preventative health care  Recommend preventative labs. She declines COVID-19 booster. We will get her colonoscopy report from October 2022.  -     BLOOD TYPE, (ABO+RH); Future  -     URINALYSIS W/ RFLX MICROSCOPIC; Future  -     METABOLIC PANEL, COMPREHENSIVE; Future  -     CBC W/O DIFF; Future  -     LIPID PANEL; Future    2. Hair thinning  Chronic hair thinning. Iron levels have been normal.  Check TSH.  -     TSH 3RD GENERATION; Future    3. Change in bowel function  Very likely secondary to IBS type picture. Colonoscopy October 2022. We will get report. Was told to repeat in 3 years? Polyps. 4. Situational anxiety  Significant anxiety. Situational secondary to work stresses, stresses regarding parents, stepchild. Offered my support. She prefers to take a daily and at this time and I do think it is reasonable to try buspirone which she can schedule up to 3 times a day but does not have to take every single day. Adjust dose based on tolerability. She has concerns about medication intolerance. -     busPIRone (BUSPAR) 7.5 mg tablet; Take 1 Tablet by mouth three (3) times daily as needed for PRN Reason (Other) (anxiety). 5. Dysfunction of both eustachian tubes  Intermittent, associated with vertigo.   Trial of Afrin for 3 days as needed, especially prior to any travel. Can use Astelin for allergic component. -     Afrin Sinus, oxymetazoline, 0.05 % nasal spray; 2 Sprays by Both Nostrils route two (2) times a day for 3 days. As needed for eustachian tube dysfunction  -     azelastine (ASTELIN) 137 mcg (0.1 %) nasal spray; 1 [place] by Both Nostrils route two (2) times a day. Use in each nostril as directed  Indications: seasonal runny nose    6. Seasonal allergic rhinitis due to pollen  -     azelastine (ASTELIN) 137 mcg (0.1 %) nasal spray; 1 [place] by Both Nostrils route two (2) times a day. Use in each nostril as directed  Indications: seasonal runny nose    7. Vertigo  Treat eustachian tube dysfunction as above. Meclizine prn.  -     meclizine (ANTIVERT) 25 mg tablet; Take 1 Tablet by mouth three (3) times daily as needed for Dizziness or Nausea for up to 10 days.  Indications: sensation of spinning or whirling      The patient is asked to make an attempt to improve diet and exercise patterns    Return for yearly wellness visits

## 2023-04-03 LAB
ABO + RH BLD: NORMAL
ALBUMIN SERPL-MCNC: 4.2 G/DL (ref 3.5–5)
ALBUMIN/GLOB SERPL: 1.3 (ref 1.1–2.2)
ALP SERPL-CCNC: 64 U/L (ref 45–117)
ALT SERPL-CCNC: 22 U/L (ref 12–78)
ANION GAP SERPL CALC-SCNC: 4 MMOL/L (ref 5–15)
APPEARANCE UR: CLEAR
AST SERPL-CCNC: 19 U/L (ref 15–37)
BACTERIA URNS QL MICRO: NEGATIVE /HPF
BILIRUB SERPL-MCNC: 0.5 MG/DL (ref 0.2–1)
BILIRUB UR QL: NEGATIVE
BLOOD BANK CMNT PATIENT-IMP: NORMAL
BUN SERPL-MCNC: 19 MG/DL (ref 6–20)
BUN/CREAT SERPL: 20 (ref 12–20)
CALCIUM SERPL-MCNC: 9.8 MG/DL (ref 8.5–10.1)
CHLORIDE SERPL-SCNC: 109 MMOL/L (ref 97–108)
CHOLEST SERPL-MCNC: 214 MG/DL
CO2 SERPL-SCNC: 28 MMOL/L (ref 21–32)
COLOR UR: ABNORMAL
CREAT SERPL-MCNC: 0.96 MG/DL (ref 0.55–1.02)
EPITH CASTS URNS QL MICRO: ABNORMAL /LPF
ERYTHROCYTE [DISTWIDTH] IN BLOOD BY AUTOMATED COUNT: 12.1 % (ref 11.5–14.5)
GLOBULIN SER CALC-MCNC: 3.3 G/DL (ref 2–4)
GLUCOSE SERPL-MCNC: 93 MG/DL (ref 65–100)
GLUCOSE UR STRIP.AUTO-MCNC: NEGATIVE MG/DL
HCT VFR BLD AUTO: 43.6 % (ref 35–47)
HDLC SERPL-MCNC: 71 MG/DL
HDLC SERPL: 3 (ref 0–5)
HGB BLD-MCNC: 13.8 G/DL (ref 11.5–16)
HGB UR QL STRIP: ABNORMAL
HYALINE CASTS URNS QL MICRO: ABNORMAL /LPF (ref 0–5)
KETONES UR QL STRIP.AUTO: NEGATIVE MG/DL
LDLC SERPL CALC-MCNC: 127.4 MG/DL (ref 0–100)
LEUKOCYTE ESTERASE UR QL STRIP.AUTO: NEGATIVE
MCH RBC QN AUTO: 31.2 PG (ref 26–34)
MCHC RBC AUTO-ENTMCNC: 31.7 G/DL (ref 30–36.5)
MCV RBC AUTO: 98.6 FL (ref 80–99)
NITRITE UR QL STRIP.AUTO: NEGATIVE
NRBC # BLD: 0 K/UL (ref 0–0.01)
NRBC BLD-RTO: 0 PER 100 WBC
PH UR STRIP: 6 (ref 5–8)
PLATELET # BLD AUTO: 223 K/UL (ref 150–400)
PMV BLD AUTO: 12.6 FL (ref 8.9–12.9)
POTASSIUM SERPL-SCNC: 4.3 MMOL/L (ref 3.5–5.1)
PROT SERPL-MCNC: 7.5 G/DL (ref 6.4–8.2)
PROT UR STRIP-MCNC: NEGATIVE MG/DL
RBC # BLD AUTO: 4.42 M/UL (ref 3.8–5.2)
RBC #/AREA URNS HPF: ABNORMAL /HPF (ref 0–5)
SODIUM SERPL-SCNC: 141 MMOL/L (ref 136–145)
SP GR UR REFRACTOMETRY: 1.01 (ref 1–1.03)
TRIGL SERPL-MCNC: 78 MG/DL (ref ?–150)
TSH SERPL DL<=0.05 MIU/L-ACNC: 2.97 UIU/ML (ref 0.36–3.74)
UROBILINOGEN UR QL STRIP.AUTO: 0.2 EU/DL (ref 0.2–1)
VLDLC SERPL CALC-MCNC: 15.6 MG/DL
WBC # BLD AUTO: 6.8 K/UL (ref 3.6–11)
WBC URNS QL MICRO: ABNORMAL /HPF (ref 0–4)

## 2023-09-05 ENCOUNTER — TRANSCRIBE ORDERS (OUTPATIENT)
Facility: HOSPITAL | Age: 53
End: 2023-09-05

## 2023-09-05 DIAGNOSIS — Z12.31 VISIT FOR SCREENING MAMMOGRAM: Primary | ICD-10-CM

## 2023-10-09 ENCOUNTER — TELEPHONE (OUTPATIENT)
Age: 53
End: 2023-10-09

## 2023-10-09 ENCOUNTER — TRANSCRIBE ORDERS (OUTPATIENT)
Facility: HOSPITAL | Age: 53
End: 2023-10-09

## 2023-10-09 ENCOUNTER — HOSPITAL ENCOUNTER (OUTPATIENT)
Facility: HOSPITAL | Age: 53
Discharge: HOME | End: 2023-10-12

## 2023-10-09 VITALS — HEIGHT: 60 IN | WEIGHT: 126 LBS | BODY MASS INDEX: 24.74 KG/M2

## 2023-10-09 DIAGNOSIS — N64.59 INVERSION OF LEFT NIPPLE: Primary | ICD-10-CM

## 2023-10-09 DIAGNOSIS — Z12.31 ENCOUNTER FOR SCREENING MAMMOGRAM FOR BREAST CANCER: Primary | ICD-10-CM

## 2023-10-09 DIAGNOSIS — Z12.31 VISIT FOR SCREENING MAMMOGRAM: ICD-10-CM

## 2023-10-09 NOTE — TELEPHONE ENCOUNTER
Spoke with Charlie Mnuiz from calling from [facility] regarding an order for an diagnostic for a (left inverted nipple) for the patient. [phone]  Fax: [phone]  Order placed as requested.

## 2023-10-09 NOTE — TELEPHONE ENCOUNTER
Spoke with patient and she went to [facility] minute clinic and had her Right ear flushed 9/25/23. Which help to resolve the pressure for 2 weeks and then on 10/7/23 the pressure was back and she  is hearing a constant hum and she can feel her heart beat in her ear. She reports using Claritin and Flonase but it has not helped. She is asking for an appt and schedule d with TASIA Zuniga 10/10/23 at 11:20 AM. Grateful for the call.

## 2023-10-09 NOTE — TELEPHONE ENCOUNTER
Bessy from calling from [address] [place] [address],[place] [address] is calling to request for the doctor to write an order for an diagnostic for a (left inverted nipple) for the patient.   [phone]  Fax: [phone]

## 2023-10-10 ENCOUNTER — OFFICE VISIT (OUTPATIENT)
Age: 53
End: 2023-10-10
Payer: COMMERCIAL

## 2023-10-10 VITALS
BODY MASS INDEX: 26.19 KG/M2 | DIASTOLIC BLOOD PRESSURE: 74 MMHG | SYSTOLIC BLOOD PRESSURE: 118 MMHG | RESPIRATION RATE: 16 BRPM | HEART RATE: 59 BPM | WEIGHT: 133.4 LBS | HEIGHT: 60 IN | OXYGEN SATURATION: 99 % | TEMPERATURE: 98.3 F

## 2023-10-10 DIAGNOSIS — H66.90 EAR INFECTION: Primary | ICD-10-CM

## 2023-10-10 PROCEDURE — 99214 OFFICE O/P EST MOD 30 MIN: CPT | Performed by: NURSE PRACTITIONER

## 2023-10-10 RX ORDER — FLUTICASONE PROPIONATE 50 MCG
1 SPRAY, SUSPENSION (ML) NASAL DAILY
COMMUNITY

## 2023-10-10 RX ORDER — AMOXICILLIN AND CLAVULANATE POTASSIUM 875; 125 MG/1; MG/1
1 TABLET, FILM COATED ORAL 2 TIMES DAILY
Qty: 14 TABLET | Refills: 0 | Status: SHIPPED | OUTPATIENT
Start: 2023-10-10 | End: 2023-10-17

## 2023-10-10 RX ORDER — METHYLPREDNISOLONE 4 MG/1
TABLET ORAL
Qty: 1 KIT | Refills: 0 | Status: SHIPPED | OUTPATIENT
Start: 2023-10-10

## 2023-10-10 ASSESSMENT — ENCOUNTER SYMPTOMS
NAUSEA: 0
SHORTNESS OF BREATH: 0
RHINORRHEA: 0
BLOOD IN STOOL: 0
CONSTIPATION: 0
EYE REDNESS: 0
RESPIRATORY NEGATIVE: 1
EYE PAIN: 0
VOMITING: 0
SORE THROAT: 0
SINUS PAIN: 0
DIARRHEA: 0
CHEST TIGHTNESS: 0
ABDOMINAL PAIN: 0
COUGH: 0
EYES NEGATIVE: 1
SINUS PRESSURE: 0
BACK PAIN: 0
GASTROINTESTINAL NEGATIVE: 1

## 2023-10-10 NOTE — PROGRESS NOTES
Assessment and Plan     1. Ear infection: Will start treatment with Augmentin, mode of use and possible side effects discussed. Medrol pack for inflammation discussed. Hydration and rest recommended. Return instructions given. Pt verbalized understanding.   -     amoxicillin-clavulanate (AUGMENTIN) 875-125 MG per tablet; Take 1 tablet by mouth 2 times daily for 7 days, Disp-14 tablet, R-0Normal  -     methylPREDNISolone (MEDROL DOSEPACK) 4 MG tablet; Follow-up package instructions, Disp-1 kit, R-0Normal     Benefits, risks, possible drug interactions, and side effects of all new medications were reviewed with the patient. Pt verbalized understanding. An electronic signature was used to authenticate this note. Lesley Smith, ANH - CNP  10/10/2023      Follow-up and Dispositions    Return if symptoms worsen or fail to improve. History of Present Illness   Chief Complaint     Jud Monsalve is a 48 y.o. female here for had concerns including Ear Fullness (Mrs. Justyn Monsalve reports constant pressure/fullness sensation in the R eye and uncomfortable for about 4 days. Mornings seems to be worse. No pain or ear discharge). Pt presents today with reports of right ear discomfort and fullness sensation, onset 4 days ago. Had a recent R-sided ear lavage at a local urgent care. Has PMH of environmental allergies, taking daily Claritin and Flonase nasal spray. Denies fever, chills, fatigue. Review of Systems  Review of Systems   Constitutional: Negative. Negative for chills, fatigue, fever and unexpected weight change. HENT:  Positive for ear pain (R ear). Negative for congestion, ear discharge, rhinorrhea, sinus pressure, sinus pain, sore throat and tinnitus. Eyes: Negative. Negative for pain, redness and visual disturbance. Respiratory: Negative. Negative for cough, chest tightness and shortness of breath. Cardiovascular: Negative. Negative for chest pain and palpitations.

## 2023-10-13 ENCOUNTER — HOSPITAL ENCOUNTER (OUTPATIENT)
Facility: HOSPITAL | Age: 53
End: 2023-10-13
Attending: INTERNAL MEDICINE
Payer: COMMERCIAL

## 2023-10-13 ENCOUNTER — HOSPITAL ENCOUNTER (OUTPATIENT)
Facility: HOSPITAL | Age: 53
Discharge: HOME | End: 2023-10-13
Attending: INTERNAL MEDICINE
Payer: COMMERCIAL

## 2023-10-13 VITALS — HEIGHT: 60 IN | BODY MASS INDEX: 26.05 KG/M2

## 2023-10-13 DIAGNOSIS — N64.59 INVERSION OF LEFT NIPPLE: ICD-10-CM

## 2023-10-13 DIAGNOSIS — N64.59 INVERTED NIPPLE: ICD-10-CM

## 2023-10-13 PROCEDURE — 77066 DX MAMMO INCL CAD BI: CPT

## 2023-10-13 PROCEDURE — 76642 ULTRASOUND BREAST LIMITED: CPT

## 2023-10-17 ENCOUNTER — OFFICE VISIT (OUTPATIENT)
Age: 53
End: 2023-10-17

## 2023-10-17 ENCOUNTER — HOSPITAL ENCOUNTER (OUTPATIENT)
Facility: HOSPITAL | Age: 53
Setting detail: SPECIMEN
Discharge: HOME | End: 2023-10-20

## 2023-10-17 ENCOUNTER — CLINICAL DOCUMENTATION (OUTPATIENT)
Age: 53
End: 2023-10-17

## 2023-10-17 VITALS — WEIGHT: 133 LBS | BODY MASS INDEX: 22.71 KG/M2 | HEIGHT: 64 IN

## 2023-10-17 DIAGNOSIS — R92.8 ABNORMAL MAMMOGRAM OF LEFT BREAST: Primary | ICD-10-CM

## 2023-10-17 DIAGNOSIS — R92.8 ABNORMAL ULTRASOUND OF BREAST: ICD-10-CM

## 2023-10-17 DIAGNOSIS — C50.112 MALIGNANT NEOPLASM OF CENTRAL PORTION OF LEFT FEMALE BREAST, UNSPECIFIED ESTROGEN RECEPTOR STATUS (HCC): ICD-10-CM

## 2023-10-17 NOTE — PROGRESS NOTES
[facility]   OFFICE PROCEDURE PROGRESS NOTE        Chart reviewed for the following:   I, Cady Gonzalez, have reviewed the History, Physical and updated the Allergic reactions for Phuong A [address] performed immediately prior to start of procedure:   I, Dr. Chelle Betancourt have performed the following reviews on [address] prior to the start of the procedure:            * Patient was identified by name and date of birth   * Agreement on procedure being performed was verified  * Risks and Benefits explained to the patient  * Procedure site verified and marked as necessary  * Patient was positioned for comfort  * Consent was signed and verified     Time: 3:30 pm      Date of procedure: 10/17/2023    Procedure performed by:  Chelle Betancourt MD    Provider assisted by: Avril Brown MA    Patient assisted by: self/    How tolerated by patient: tolerated the procedure well with no complications    Post Procedural Pain Scale: 0    Comments: none

## 2023-10-17 NOTE — PROGRESS NOTES
breast.    Sonographic evaluation was also performed in the 7:00 position of the left  breast.  Multiple small echogenic foci are consistent with the calcifications  demonstrated on the mammogram.  There are no solid masses in this region. No significant left axillary lymphadenopathy. Impression  1. Irregular mass in the retroareolar region of the left breast with  corresponding architectural distortion is consistent with malignancy. BI-RADS  5.    2.  Group of indeterminate microcalcifications in a regional distribution in the  inferior aspect of the left breast is also suspicious. BI-RADS 4.    OVERALL BI-RADS 5: Highly suggestive of malignancy. RECOMMENDATIONS:  1. Left retroareolar mass: Immediate ultrasound guided left breast biopsy. 2.  Regional indeterminate microcalcifications of the left breast: The course of  action should be based on patient preference and breast surgeon recommendations. Approaches include breast MRI before and after the other biopsy to further  evaluate the area or possible stereotactic biopsy. The findings and recommendations were discussed with the patient. Review of Systems      Physical Exam  Cardiovascular:      Rate and Rhythm: Normal rate and regular rhythm. Pulmonary:      Effort: Pulmonary effort is normal.      Breath sounds: Normal breath sounds. Chest:   Breasts:     Right: No swelling, mass, skin change or tenderness. Left: Inverted nipple (nipple retracted laterally) present. No swelling, mass, skin change or tenderness. Lymphadenopathy:      Upper Body:      Right upper body: No axillary adenopathy. Left upper body: No axillary adenopathy. Neurological:      Mental Status: She is alert. US - Guided Core Biopsy  Indication : Left Breast mass upper outer quadrant. not palpable. Ultrasound Findings: irregular, hypoechoic, antiparallel mass that measures approximately 0.6 x 1.2 x 1.7 cm. There is extensive shadowing.

## 2023-10-24 ENCOUNTER — TELEPHONE (OUTPATIENT)
Age: 53
End: 2023-10-24

## 2023-10-24 PROBLEM — C50.912 BREAST CANCER, LEFT (HCC): Status: ACTIVE | Noted: 2023-10-24

## 2023-10-24 NOTE — TELEPHONE ENCOUNTER
Called patient  10/2023 invasive ductal carcinoma, grade 2,  ER 90%, IA 90%, Her2 neg, Ki 1%    Breast MRI 11/2    Pt will need bracketing Magseed localization of calcs    Pt and I discussed a lumpectomy vs mastectomy, post op radiation, possibility of + margins

## 2023-11-02 ENCOUNTER — TELEPHONE (OUTPATIENT)
Age: 53
End: 2023-11-02

## 2023-11-02 ENCOUNTER — HOSPITAL ENCOUNTER (OUTPATIENT)
Facility: HOSPITAL | Age: 53
Discharge: HOME | End: 2023-11-02
Attending: SURGERY
Payer: COMMERCIAL

## 2023-11-02 DIAGNOSIS — C50.112 MALIGNANT NEOPLASM OF CENTRAL PORTION OF LEFT FEMALE BREAST, UNSPECIFIED ESTROGEN RECEPTOR STATUS (HCC): ICD-10-CM

## 2023-11-02 PROCEDURE — C8908 MRI W/O FOL W/CONT, BREAST,: HCPCS

## 2023-11-02 PROCEDURE — A9585 GADOBUTROL INJECTION: HCPCS | Performed by: SURGERY

## 2023-11-02 PROCEDURE — 6360000004 HC RX CONTRAST MEDICATION: Performed by: SURGERY

## 2023-11-02 RX ORDER — GADOBUTROL 604.72 MG/ML
6 INJECTION INTRAVENOUS
Status: COMPLETED | OUTPATIENT
Start: 2023-11-02 | End: 2023-11-02

## 2023-11-02 RX ADMIN — GADOBUTROL 6 ML: 604.72 INJECTION INTRAVENOUS at 09:56

## 2023-11-02 NOTE — TELEPHONE ENCOUNTER
breast.    Miscellaneous: None. Impression  1. 2.1 cm biopsy-proven carcinoma in the left breast 2:00 position. Biopsy clip  is at the medial margin. 2. 4.4 cm regional enhancement in the inferior left breast corresponds to the  suspicious microcalcifications on mammogram and most likely represents  multicentric disease. 3. No lymphadenopathy. 4. No MRI evidence of malignancy in the right breast.    Assessment: ACR BI-RADS category  Left breast: BI-RADS Assessment Category 6: Known biopsy proven malignancy-  Appropriate action should be taken. Secondary left breast findings: BI-RADS Assessment Category 4: Suspicious  abnormality - Biopsy should be performed in the absence of clinical  contraindication. Right breast: BI-RADS Assessment Category 2: Benign finding. Recommendation: Stereotactic biopsy of left breast calcifications unless patient  chooses treatment option of left mastectomy. A negative breast MRI examination speaks strongly against invasive cancer down  to a detection threshold of 3 to 5 mm but may not detect some lower grade or in  situ carcinomas. Therefore, routine clinical and mammographic followup are  recommended. A summary portfolio has been created in PACS. LEFT breast lumpectomy and SLNB  Magseed localization of the calcifications.   I would like a clip at the

## 2023-11-03 DIAGNOSIS — R92.8 ABNORMAL MAMMOGRAM OF LEFT BREAST: Primary | ICD-10-CM

## 2023-11-06 ENCOUNTER — PREP FOR PROCEDURE (OUTPATIENT)
Age: 53
End: 2023-11-06

## 2023-11-07 ENCOUNTER — TELEPHONE (OUTPATIENT)
Age: 53
End: 2023-11-07

## 2023-11-07 NOTE — TELEPHONE ENCOUNTER
Patient Surgery Information Sheet      Patient Name:  Phuong Sharif  Surgery Date:  November 13, 2023    Type of Surgery:  LEFT BREAST LUMPECTOMY AND LEFT BREAST SENTINEL NODE BIOPSY AND BLUE DYE    Estimated arrival time 9:00 AM    Arrival time will be confirmed the afternoon before your surgery.    Pre-procedure: Not Applicable    Pre-Operative Testing Department will call to schedule pre-op testing appointment if needed before surgery    Hospital:  [facility]  Address:  [address] [place], [place] [id number]  Check in location:  Through the Main Entrance of [place], Take the elevator on the left side to the second floor on your left as you step out of the elevator    Pre-Operative Instructions:  Will be given at the pre-op appointment.    Special Instructions if needed:     NPO (nothing by mouth) or drinking after midnight the night before Surgery  Patient may shower the morning of, do not use an lotion, deodorant, powders, perfumes or makeup  Patient will need  the morning of surgery     Surgery Scheduler:  Shira Aragon

## 2023-11-09 ENCOUNTER — HOSPITAL ENCOUNTER (OUTPATIENT)
Facility: HOSPITAL | Age: 53
Discharge: HOME | End: 2023-11-09
Payer: COMMERCIAL

## 2023-11-09 VITALS
OXYGEN SATURATION: 99 % | TEMPERATURE: 97.5 F | RESPIRATION RATE: 18 BRPM | HEART RATE: 69 BPM | DIASTOLIC BLOOD PRESSURE: 57 MMHG | HEIGHT: 64 IN | BODY MASS INDEX: 22.2 KG/M2 | SYSTOLIC BLOOD PRESSURE: 120 MMHG | WEIGHT: 130 LBS

## 2023-11-09 LAB
EKG ATRIAL RATE: 61 BPM
EKG DIAGNOSIS: NORMAL
EKG P AXIS: 77 DEGREES
EKG P-R INTERVAL: 128 MS
EKG Q-T INTERVAL: 400 MS
EKG QRS DURATION: 80 MS
EKG QTC CALCULATION (BAZETT): 402 MS
EKG R AXIS: 51 DEGREES
EKG T AXIS: 44 DEGREES
EKG VENTRICULAR RATE: 61 BPM

## 2023-11-09 PROCEDURE — 93005 ELECTROCARDIOGRAM TRACING: CPT | Performed by: NURSE PRACTITIONER

## 2023-11-09 PROCEDURE — 93010 ELECTROCARDIOGRAM REPORT: CPT | Performed by: INTERNAL MEDICINE

## 2023-11-09 RX ORDER — AZELASTINE 1 MG/ML
1 SPRAY, METERED NASAL NIGHTLY
COMMUNITY

## 2023-11-10 ENCOUNTER — HOSPITAL ENCOUNTER (OUTPATIENT)
Facility: HOSPITAL | Age: 53
End: 2023-11-10
Attending: SURGERY
Payer: COMMERCIAL

## 2023-11-10 ENCOUNTER — APPOINTMENT (OUTPATIENT)
Facility: HOSPITAL | Age: 53
End: 2023-11-10
Payer: COMMERCIAL

## 2023-11-10 ENCOUNTER — HOSPITAL ENCOUNTER (EMERGENCY)
Facility: HOSPITAL | Age: 53
Discharge: HOME | End: 2023-11-10
Attending: STUDENT IN AN ORGANIZED HEALTH CARE EDUCATION/TRAINING PROGRAM
Payer: COMMERCIAL

## 2023-11-10 VITALS
BODY MASS INDEX: 22.2 KG/M2 | RESPIRATION RATE: 15 BRPM | WEIGHT: 130 LBS | SYSTOLIC BLOOD PRESSURE: 114 MMHG | OXYGEN SATURATION: 98 % | HEIGHT: 64 IN | TEMPERATURE: 97.6 F | DIASTOLIC BLOOD PRESSURE: 60 MMHG | HEART RATE: 85 BPM

## 2023-11-10 DIAGNOSIS — R55 NEAR SYNCOPE: Primary | ICD-10-CM

## 2023-11-10 DIAGNOSIS — R92.8 ABNORMAL MAMMOGRAM OF LEFT BREAST: ICD-10-CM

## 2023-11-10 LAB
ALBUMIN SERPL-MCNC: 3.7 G/DL (ref 3.5–5)
ALBUMIN/GLOB SERPL: 1.1 (ref 1.1–2.2)
ALP SERPL-CCNC: 61 U/L (ref 45–117)
ALT SERPL-CCNC: 26 U/L (ref 12–78)
ANION GAP SERPL CALC-SCNC: 11 MMOL/L (ref 5–15)
AST SERPL-CCNC: 23 U/L (ref 15–37)
BASOPHILS # BLD: 0.1 K/UL (ref 0–0.1)
BASOPHILS NFR BLD: 1 % (ref 0–1)
BILIRUB SERPL-MCNC: 0.4 MG/DL (ref 0.2–1)
BUN SERPL-MCNC: 24 MG/DL (ref 6–20)
BUN/CREAT SERPL: 24 (ref 12–20)
CALCIUM SERPL-MCNC: 9.3 MG/DL (ref 8.5–10.1)
CHLORIDE SERPL-SCNC: 103 MMOL/L (ref 97–108)
CO2 SERPL-SCNC: 22 MMOL/L (ref 21–32)
CREAT SERPL-MCNC: 0.98 MG/DL (ref 0.55–1.02)
DIFFERENTIAL METHOD BLD: NORMAL
EKG ATRIAL RATE: 69 BPM
EKG DIAGNOSIS: NORMAL
EKG P AXIS: 56 DEGREES
EKG P-R INTERVAL: 128 MS
EKG Q-T INTERVAL: 426 MS
EKG QRS DURATION: 86 MS
EKG QTC CALCULATION (BAZETT): 456 MS
EKG R AXIS: 47 DEGREES
EKG T AXIS: 39 DEGREES
EKG VENTRICULAR RATE: 69 BPM
EOSINOPHIL # BLD: 0.1 K/UL (ref 0–0.4)
EOSINOPHIL NFR BLD: 1 % (ref 0–7)
ERYTHROCYTE [DISTWIDTH] IN BLOOD BY AUTOMATED COUNT: 11.8 % (ref 11.5–14.5)
GLOBULIN SER CALC-MCNC: 3.4 G/DL (ref 2–4)
GLUCOSE BLD STRIP.AUTO-MCNC: 104 MG/DL (ref 65–117)
GLUCOSE SERPL-MCNC: 117 MG/DL (ref 65–100)
HCT VFR BLD AUTO: 37.8 % (ref 35–47)
HGB BLD-MCNC: 13.2 G/DL (ref 11.5–16)
IMM GRANULOCYTES # BLD AUTO: 0 K/UL (ref 0–0.04)
IMM GRANULOCYTES NFR BLD AUTO: 0 % (ref 0–0.5)
LYMPHOCYTES # BLD: 2.4 K/UL (ref 0.8–3.5)
LYMPHOCYTES NFR BLD: 31 % (ref 12–49)
MCH RBC QN AUTO: 32.8 PG (ref 26–34)
MCHC RBC AUTO-ENTMCNC: 34.9 G/DL (ref 30–36.5)
MCV RBC AUTO: 93.8 FL (ref 80–99)
MONOCYTES # BLD: 0.6 K/UL (ref 0–1)
MONOCYTES NFR BLD: 8 % (ref 5–13)
NEUTS SEG # BLD: 4.6 K/UL (ref 1.8–8)
NEUTS SEG NFR BLD: 59 % (ref 32–75)
NRBC # BLD: 0 K/UL (ref 0–0.01)
NRBC BLD-RTO: 0 PER 100 WBC
PLATELET # BLD AUTO: 187 K/UL (ref 150–400)
PMV BLD AUTO: 11.5 FL (ref 8.9–12.9)
POTASSIUM SERPL-SCNC: 3.4 MMOL/L (ref 3.5–5.1)
PROT SERPL-MCNC: 7.1 G/DL (ref 6.4–8.2)
RBC # BLD AUTO: 4.03 M/UL (ref 3.8–5.2)
SERVICE CMNT-IMP: NORMAL
SODIUM SERPL-SCNC: 136 MMOL/L (ref 136–145)
WBC # BLD AUTO: 7.7 K/UL (ref 3.6–11)

## 2023-11-10 PROCEDURE — 85025 COMPLETE CBC W/AUTO DIFF WBC: CPT

## 2023-11-10 PROCEDURE — 19281 PERQ DEVICE BREAST 1ST IMAG: CPT

## 2023-11-10 PROCEDURE — 96375 TX/PRO/DX INJ NEW DRUG ADDON: CPT

## 2023-11-10 PROCEDURE — 36415 COLL VENOUS BLD VENIPUNCTURE: CPT

## 2023-11-10 PROCEDURE — 80053 COMPREHEN METABOLIC PANEL: CPT

## 2023-11-10 PROCEDURE — 82962 GLUCOSE BLOOD TEST: CPT

## 2023-11-10 PROCEDURE — 6360000002 HC RX W HCPCS: Performed by: STUDENT IN AN ORGANIZED HEALTH CARE EDUCATION/TRAINING PROGRAM

## 2023-11-10 PROCEDURE — 93005 ELECTROCARDIOGRAM TRACING: CPT | Performed by: STUDENT IN AN ORGANIZED HEALTH CARE EDUCATION/TRAINING PROGRAM

## 2023-11-10 PROCEDURE — 6360000002 HC RX W HCPCS

## 2023-11-10 PROCEDURE — 99285 EMERGENCY DEPT VISIT HI MDM: CPT

## 2023-11-10 PROCEDURE — 93010 ELECTROCARDIOGRAM REPORT: CPT | Performed by: SPECIALIST

## 2023-11-10 PROCEDURE — 96374 THER/PROPH/DIAG INJ IV PUSH: CPT

## 2023-11-10 PROCEDURE — 2580000003 HC RX 258: Performed by: STUDENT IN AN ORGANIZED HEALTH CARE EDUCATION/TRAINING PROGRAM

## 2023-11-10 PROCEDURE — 71045 X-RAY EXAM CHEST 1 VIEW: CPT

## 2023-11-10 RX ORDER — ONDANSETRON 2 MG/ML
INJECTION INTRAMUSCULAR; INTRAVENOUS
Status: COMPLETED
Start: 2023-11-10 | End: 2023-11-10

## 2023-11-10 RX ORDER — ONDANSETRON 4 MG/1
4 TABLET, ORALLY DISINTEGRATING ORAL 3 TIMES DAILY PRN
Qty: 21 TABLET | Refills: 0 | Status: SHIPPED | OUTPATIENT
Start: 2023-11-10

## 2023-11-10 RX ORDER — METOCLOPRAMIDE HYDROCHLORIDE 5 MG/ML
5 INJECTION INTRAMUSCULAR; INTRAVENOUS
Status: COMPLETED | OUTPATIENT
Start: 2023-11-10 | End: 2023-11-10

## 2023-11-10 RX ORDER — 0.9 % SODIUM CHLORIDE 0.9 %
1000 INTRAVENOUS SOLUTION INTRAVENOUS ONCE
Status: COMPLETED | OUTPATIENT
Start: 2023-11-10 | End: 2023-11-10

## 2023-11-10 RX ORDER — 0.9 % SODIUM CHLORIDE 0.9 %
500 INTRAVENOUS SOLUTION INTRAVENOUS ONCE
Status: COMPLETED | OUTPATIENT
Start: 2023-11-10 | End: 2023-11-10

## 2023-11-10 RX ORDER — ONDANSETRON 2 MG/ML
4 INJECTION INTRAMUSCULAR; INTRAVENOUS
Status: COMPLETED | OUTPATIENT
Start: 2023-11-10 | End: 2023-11-10

## 2023-11-10 RX ORDER — METOCLOPRAMIDE 5 MG/1
5 TABLET ORAL 3 TIMES DAILY PRN
Qty: 30 TABLET | Refills: 0 | Status: SHIPPED | OUTPATIENT
Start: 2023-11-10 | End: 2023-11-20

## 2023-11-10 RX ADMIN — ONDANSETRON 4 MG: 2 INJECTION INTRAMUSCULAR; INTRAVENOUS at 13:23

## 2023-11-10 RX ADMIN — SODIUM CHLORIDE 1000 ML: 9 INJECTION, SOLUTION INTRAVENOUS at 14:29

## 2023-11-10 RX ADMIN — SODIUM CHLORIDE 500 ML: 9 INJECTION, SOLUTION INTRAVENOUS at 12:53

## 2023-11-10 RX ADMIN — METOCLOPRAMIDE 5 MG: 5 INJECTION, SOLUTION INTRAMUSCULAR; INTRAVENOUS at 15:56

## 2023-11-10 RX ADMIN — SODIUM CHLORIDE 500 ML: 900 INJECTION, SOLUTION INTRAVENOUS at 13:54

## 2023-11-10 ASSESSMENT — PAIN - FUNCTIONAL ASSESSMENT: PAIN_FUNCTIONAL_ASSESSMENT: NONE - DENIES PAIN

## 2023-11-10 NOTE — ED TRIAGE NOTES
Patient was brought to ED treatment room via wheelchair, with c/o syncopal episode during a left breast biopsy in a nearby office around 1100 today    Patient states she has hx of near syncopal episodes during procedures and exams. Patient states she feels she is weak on left side.      Clear speech, no facial droop, no visual disturbances or changes, full sensation

## 2023-11-10 NOTE — PROGRESS NOTES
11:30 a.m. Patient in procedure room getting ready for a magseed placement. Dr. Hui Starks gave her the lidocaine for the numbing (about 8 cc); they were getting ready to do the mammogram and patient stated that she was feeling light-headed and dizzy and weak. Everything was stopped and Dr. Hui Starks and [facility] were holding patient in a sitting position in a chair. A stretcher was brought in and patient transferred to that. Patient verbal the whole time, very briefly lost consciousness - less than a minute. 11:35 a.m. Patient BP - 115/71, pulse 59; continues to feel weak and light-headed. 11:48 a.m. Patient BP - 119/68, pulse 65; patient verbalized continued feelings of weakness and light-headedness. Dr. Hui Starks called patient  to come here; Dr. Hui Starks also reached out to Dr. Alondra Madden regarding her surgery scheduled for this Monday. 11:53 a.m. Patient states that she feels nauseous. Sat patient up in the stretcher and gave her an emesis bag.    12:05 p.m. Patient /61, pulse 68. Patient verbal, eating ice chips. Patient states that her left arm was feeling a little tingly and slightly numb. 12:20 p.m. Patient placed in a wheelchair and her  took her downstairs to the ED.

## 2023-11-10 NOTE — ED NOTES
Patient discharged to home with two prescriptions sent to pharmacy. Patient and family verbalized understanding of discharge instructions and follow up guidelines. Patient ambulatory with family out of ED with a steady gait.       Daily, Jazzy Morse RN  11/10/23 [id number]

## 2023-11-10 NOTE — ED NOTES
Patient ambulated with steady gait to restroom to void, states she is feeling much better      Daily, Keo Handy, RN  11/10/23 [id number]

## 2023-11-10 NOTE — ED NOTES
Patient given debbie crackers, peanut butter, and ginger ale.       Daily, Francoise Chao RN  11/10/23 [id number]

## 2023-11-10 NOTE — DISCHARGE INSTRUCTIONS
You presented to ED after a near syncopal/syncopal episode. I suspect it was secondary to the procedure you had today. Lab work, EKG and chest x-ray here are without concerning findings. You felt better after observation here in the ED with IV fluids and nausea medications. Prescriptions written for Reglan and Zofran for future nausea. Fill if needed. Otherwise follow-up with your breast surgeon for further outpatient evaluation. You may also follow-up outpatient with your primary care doctor for further evaluation of syncope/near syncope.

## 2023-11-10 NOTE — ED PROVIDER NOTES
HPI    48 y.o. female presenting after an episode of near syncope. She was in a different portion of the [facility] [address] at [facility]; after she had been given lidocaine about 8 cc (its not indicated whether this was whether without epinephrine in a concentration) she developed lightheadedness. This is prior to placement of her mag seed. She apparently did not experience a loss of consciousness. She was noted to have normal vital signs during this episode but she appeared weak and pale. She is also complaining of nausea. She denies palpitations or rapid heartbeat. She did not have chest pain or dyspnea. She is gradually feeling better but still does not feel quite herself. She a has no history of lidocaine or other local anesthetic allergy/intolerance. Francisca Barrow   has a past medical history of Appendicitis, acute, Atypical nevus, Cochlear hydrops of right ear, Eustachian tube dysfunction, Heart palpitations, House dust mite allergy, Nephrolithiasis, Pap smear for cervical cancer screening, Scoliosis, Shortened MN interval, and Vertigo. Physical Exam  Vitals reviewed. Constitutional:       General: She is not in acute distress. Appearance: Normal appearance. HENT:      Head: Normocephalic. Mouth/Throat:      Mouth: Mucous membranes are moist.   Eyes:      Extraocular Movements: Extraocular movements intact. Pupils: Pupils are equal, round, and reactive to light. Cardiovascular:      Pulses: Normal pulses. Pulmonary:      Effort: Pulmonary effort is normal. No respiratory distress. Abdominal:      General: Abdomen is flat. Musculoskeletal:      Cervical back: Neck supple. No rigidity. Right lower leg: No edema. Left lower leg: No edema. Skin:     General: Skin is warm. Capillary Refill: Capillary refill takes less than 2 seconds. Coloration: Skin is pale. Neurological:      General: No focal deficit present.       Mental Status: She is
AVS: You presented to ED after a near syncopal/syncopal episode. I suspect it was secondary to the procedure you had today. Lab work, EKG and chest x-ray here are without concerning findings. You felt better after observation here in the ED with IV fluids and nausea medications. Prescriptions written for Reglan and Zofran for future nausea. Fill if needed. Otherwise follow-up with your breast surgeon for further outpatient evaluation. You may also follow-up outpatient with your primary care doctor for further evaluation of syncope/near syncope. The patient has been re-evaluated and feeling better. Patient is stable for discharge. All available radiology and laboratory results have been reviewed with patient and/or available family. Patient and/or family verbally conveyed their understanding and agreement of the patient's signs, symptoms, diagnosis, treatment and prognosis and additionally agree to follow-up as recommended in the discharge instructions or to return to the Emergency Department should their condition change or worsen prior to their follow-up appointment. All questions have been answered and patient and/or available family express understanding.       Prescriptions provided to the patient:     Discharge Medication List as of 11/10/2023  5:12 PM        START taking these medications    Details   metoclopramide (REGLAN) 5 MG tablet Take 1 tablet by mouth 3 times daily as needed (nausea), Disp-30 tablet, R-0Print      ondansetron (ZOFRAN-ODT) 4 MG disintegrating tablet Take 1 tablet by mouth 3 times daily as needed for Nausea or Vomiting, Disp-21 tablet, R-0Print              Donovan Hyatt MD   Emergency Medicine Attending Physician          Donovan Hyatt MD  11/10/23 7419

## 2023-11-10 NOTE — ED NOTES
Orthostatic BP Readings obtained:     Supine:   90 HR  119/57 BP    Sittin HR  123/62    Standin HR  127/65     Daily, Lizett Mitchell RN  11/10/23 8354

## 2023-11-10 NOTE — ED NOTES
Lab work obtained via PIV and samples sent to lab for testing. Patient tolerated well. IV fluids infusing without difficulty. Patient resting on stretcher with blanket given for comfort. Family at bedside and call light within reach.       Daily, Sharad Perkins RN  11/10/23 1257

## 2023-11-10 NOTE — ED NOTES
Patient medicated, tolerated well. Patient speaking to breast center provider at this time via phone.           Daily, [facility], RN  11/10/23 9200

## 2023-11-13 ENCOUNTER — PREP FOR PROCEDURE (OUTPATIENT)
Age: 53
End: 2023-11-13

## 2023-11-13 ENCOUNTER — TELEPHONE (OUTPATIENT)
Age: 53
End: 2023-11-13

## 2023-11-13 PROBLEM — C50.912 MALIGNANT NEOPLASM OF LEFT BREAST (HCC): Status: RESOLVED | Noted: 2023-11-13 | Resolved: 2023-11-13

## 2023-11-13 PROBLEM — C50.912 MALIGNANT NEOPLASM OF LEFT BREAST (HCC): Status: ACTIVE | Noted: 2023-11-13

## 2023-11-13 NOTE — TELEPHONE ENCOUNTER
Please reschedule for MAGSEED placement, LEFT breast.  Coordinated with Dr Mena Rosario breast lumpectomy and SLNB with MAGSEED localization

## 2023-11-13 NOTE — PROGRESS NOTES
Pt's sx TBC until after magseed placement on 11/20 @ [address]. Ricarda Ness'  to r/s for after that date.

## 2023-11-13 NOTE — TELEPHONE ENCOUNTER
Patient Surgery Information Sheet      Patient Name:  Francisca Barrow  Surgery Date:  November 17, 2023    Type of Surgery:  LEFT BREAST LUMPECTOMY AND LEFT BREAST SENTINEL NODE BIOPSY AND BLUE DYE    Estimated arrival time 7:30AM    Arrival time will be confirmed the afternoon before your surgery. Pre-procedure: Not Applicable    Pre-Operative Testing Department will call to schedule pre-op testing appointment if needed before surgery    Hospital:  [facility]  Address:  [address] [place] [address]  Check in location:  Through the Main Entrance of [facility], Take the elevator on the left side to the second floor on your left as you step out of the elevator    Pre-Operative Instructions: Will be given at the pre-op appointment.     Special Instructions if needed:     NPO (nothing by mouth) or drinking after midnight the night before Surgery  Patient may shower the morning of, do not use an lotion, deodorant, powders, perfumes or makeup  Patient will need  the morning of surgery     Surgery Scheduler:  Aldair Guillen

## 2023-11-16 ENCOUNTER — TELEPHONE (OUTPATIENT)
Age: 53
End: 2023-11-16

## 2023-11-16 NOTE — TELEPHONE ENCOUNTER
This is a date CHANGE     Patient Surgery Information Sheet      Patient Name:  Phuong Sharif  Surgery Date:  November 22, 2023    Type of Surgery:  LEFT BREAST LUMPECTOMY AND LEFT BREAST SENTINEL NODE BIOPSY AND BLUE DYE    Estimated arrival time 7:00 AM    Arrival time will be confirmed the afternoon before your surgery.    Pre-procedure: Not Applicable    Pre-Operative Testing Department will call to schedule pre-op testing appointment if needed before surgery    Hospital:  [facility]  Address:  [address] [place] [address]  Check in location:  Through the Main Entrance of [place], Take the elevator on the left side to the second floor on your left as you step out of the elevator    Pre-Operative Instructions:  Will be given at the pre-op appointment.    Special Instructions if needed:     NPO (nothing by mouth) or drinking after midnight the night before Surgery  Patient may shower the morning of, do not use an lotion, deodorant, powders, perfumes or makeup  Patient will need  the morning of surgery     Surgery Scheduler:  Shira Aragon

## 2023-11-20 ENCOUNTER — HOSPITAL ENCOUNTER (OUTPATIENT)
Facility: HOSPITAL | Age: 53
Discharge: HOME | End: 2023-11-23
Attending: SURGERY
Payer: COMMERCIAL

## 2023-11-20 DIAGNOSIS — R92.8 ABNORMAL MAMMOGRAM OF LEFT BREAST: ICD-10-CM

## 2023-11-20 PROCEDURE — 19284 PERQ DEV BREAST ADD STRTCTC: CPT

## 2023-11-20 PROCEDURE — 2500000003 HC RX 250 WO HCPCS: Performed by: RADIOLOGY

## 2023-11-20 PROCEDURE — 19283 PERQ DEV BREAST 1ST STRTCTC: CPT

## 2023-11-20 RX ORDER — LIDOCAINE HYDROCHLORIDE 10 MG/ML
10 INJECTION, SOLUTION INFILTRATION; PERINEURAL ONCE
Status: COMPLETED | OUTPATIENT
Start: 2023-11-20 | End: 2023-11-20

## 2023-11-20 RX ORDER — LIDOCAINE HYDROCHLORIDE 10 MG/ML
10 INJECTION, SOLUTION EPIDURAL; INFILTRATION; INTRACAUDAL; PERINEURAL ONCE
Status: DISCONTINUED | OUTPATIENT
Start: 2023-11-20 | End: 2023-11-20 | Stop reason: CLARIF

## 2023-11-20 RX ADMIN — LIDOCAINE HYDROCHLORIDE 10 ML: 10 INJECTION, SOLUTION INFILTRATION; PERINEURAL at 09:55

## 2023-11-20 NOTE — PROGRESS NOTES
Patient tolerated left breast mag seed placement x 2 well with scant bleeding. Site was covered with a gauze and tegaderm. Discharge instructions were reviewed with the patient and she was provided with a written copy as well. Encouraged her to call with any questions or concerns.

## 2023-11-21 ENCOUNTER — ANESTHESIA EVENT (OUTPATIENT)
Facility: HOSPITAL | Age: 53
End: 2023-11-21
Payer: COMMERCIAL

## 2023-11-22 ENCOUNTER — ANESTHESIA (OUTPATIENT)
Facility: HOSPITAL | Age: 53
End: 2023-11-22
Payer: COMMERCIAL

## 2023-11-22 ENCOUNTER — HOSPITAL ENCOUNTER (OUTPATIENT)
Facility: HOSPITAL | Age: 53
Setting detail: OUTPATIENT SURGERY
Discharge: HOME | End: 2023-11-22
Attending: SURGERY | Admitting: SURGERY
Payer: COMMERCIAL

## 2023-11-22 VITALS
WEIGHT: 135 LBS | DIASTOLIC BLOOD PRESSURE: 62 MMHG | TEMPERATURE: 97.6 F | SYSTOLIC BLOOD PRESSURE: 125 MMHG | RESPIRATION RATE: 12 BRPM | OXYGEN SATURATION: 100 % | HEART RATE: 66 BPM | HEIGHT: 64 IN | BODY MASS INDEX: 23.05 KG/M2

## 2023-11-22 PROCEDURE — 7100000001 HC PACU RECOVERY - ADDTL 15 MIN: Performed by: SURGERY

## 2023-11-22 PROCEDURE — 3700000000 HC ANESTHESIA ATTENDED CARE: Performed by: SURGERY

## 2023-11-22 PROCEDURE — 7100000010 HC PHASE II RECOVERY - FIRST 15 MIN: Performed by: SURGERY

## 2023-11-22 PROCEDURE — 3700000001 HC ADD 15 MINUTES (ANESTHESIA): Performed by: SURGERY

## 2023-11-22 PROCEDURE — 6360000002 HC RX W HCPCS: Performed by: NURSE ANESTHETIST, CERTIFIED REGISTERED

## 2023-11-22 PROCEDURE — 2500000003 HC RX 250 WO HCPCS: Performed by: NURSE ANESTHETIST, CERTIFIED REGISTERED

## 2023-11-22 PROCEDURE — 3600000013 HC SURGERY LEVEL 3 ADDTL 15MIN: Performed by: SURGERY

## 2023-11-22 PROCEDURE — 3600000003 HC SURGERY LEVEL 3 BASE: Performed by: SURGERY

## 2023-11-22 PROCEDURE — 2709999900 HC NON-CHARGEABLE SUPPLY: Performed by: SURGERY

## 2023-11-22 PROCEDURE — 2500000003 HC RX 250 WO HCPCS: Performed by: SURGERY

## 2023-11-22 PROCEDURE — 7100000000 HC PACU RECOVERY - FIRST 15 MIN: Performed by: SURGERY

## 2023-11-22 RX ORDER — DEXMEDETOMIDINE HYDROCHLORIDE 100 UG/ML
INJECTION, SOLUTION INTRAVENOUS PRN
Status: DISCONTINUED | OUTPATIENT
Start: 2023-11-22 | End: 2023-11-22 | Stop reason: SDUPTHER

## 2023-11-22 RX ORDER — MEPERIDINE HYDROCHLORIDE 25 MG/ML
12.5 INJECTION INTRAMUSCULAR; INTRAVENOUS; SUBCUTANEOUS EVERY 5 MIN PRN
Status: DISCONTINUED | OUTPATIENT
Start: 2023-11-22 | End: 2023-11-22 | Stop reason: HOSPADM

## 2023-11-22 RX ORDER — FAMOTIDINE 10 MG/ML
INJECTION, SOLUTION INTRAVENOUS PRN
Status: DISCONTINUED | OUTPATIENT
Start: 2023-11-22 | End: 2023-11-22 | Stop reason: SDUPTHER

## 2023-11-22 RX ORDER — SODIUM CHLORIDE, SODIUM LACTATE, POTASSIUM CHLORIDE, CALCIUM CHLORIDE 600; 310; 30; 20 MG/100ML; MG/100ML; MG/100ML; MG/100ML
INJECTION, SOLUTION INTRAVENOUS CONTINUOUS
Status: DISCONTINUED | OUTPATIENT
Start: 2023-11-22 | End: 2023-11-22 | Stop reason: HOSPADM

## 2023-11-22 RX ORDER — METHYLENE BLUE 10 MG/ML
INJECTION INTRAVENOUS PRN
Status: DISCONTINUED | OUTPATIENT
Start: 2023-11-22 | End: 2023-11-22 | Stop reason: ALTCHOICE

## 2023-11-22 RX ORDER — EPHEDRINE SULFATE/0.9% NACL/PF 50 MG/5 ML
SYRINGE (ML) INTRAVENOUS PRN
Status: DISCONTINUED | OUTPATIENT
Start: 2023-11-22 | End: 2023-11-22 | Stop reason: SDUPTHER

## 2023-11-22 RX ORDER — LIDOCAINE HYDROCHLORIDE 10 MG/ML
1 INJECTION, SOLUTION EPIDURAL; INFILTRATION; INTRACAUDAL; PERINEURAL
Status: DISCONTINUED | OUTPATIENT
Start: 2023-11-22 | End: 2023-11-22 | Stop reason: HOSPADM

## 2023-11-22 RX ORDER — DROPERIDOL 2.5 MG/ML
0.62 INJECTION, SOLUTION INTRAMUSCULAR; INTRAVENOUS
Status: DISCONTINUED | OUTPATIENT
Start: 2023-11-22 | End: 2023-11-22 | Stop reason: HOSPADM

## 2023-11-22 RX ORDER — FENTANYL CITRATE 50 UG/ML
INJECTION, SOLUTION INTRAMUSCULAR; INTRAVENOUS PRN
Status: DISCONTINUED | OUTPATIENT
Start: 2023-11-22 | End: 2023-11-22 | Stop reason: SDUPTHER

## 2023-11-22 RX ORDER — ACETAMINOPHEN 325 MG/1
650 TABLET ORAL ONCE
Status: DISCONTINUED | OUTPATIENT
Start: 2023-11-22 | End: 2023-11-22 | Stop reason: HOSPADM

## 2023-11-22 RX ORDER — DIPHENHYDRAMINE HYDROCHLORIDE 50 MG/ML
INJECTION INTRAMUSCULAR; INTRAVENOUS PRN
Status: DISCONTINUED | OUTPATIENT
Start: 2023-11-22 | End: 2023-11-22 | Stop reason: SDUPTHER

## 2023-11-22 RX ORDER — ONDANSETRON 2 MG/ML
4 INJECTION INTRAMUSCULAR; INTRAVENOUS
Status: DISCONTINUED | OUTPATIENT
Start: 2023-11-22 | End: 2023-11-22 | Stop reason: HOSPADM

## 2023-11-22 RX ORDER — DIPHENHYDRAMINE HYDROCHLORIDE 50 MG/ML
12.5 INJECTION INTRAMUSCULAR; INTRAVENOUS
Status: DISCONTINUED | OUTPATIENT
Start: 2023-11-22 | End: 2023-11-22 | Stop reason: HOSPADM

## 2023-11-22 RX ORDER — LABETALOL HYDROCHLORIDE 5 MG/ML
10 INJECTION, SOLUTION INTRAVENOUS
Status: DISCONTINUED | OUTPATIENT
Start: 2023-11-22 | End: 2023-11-22 | Stop reason: HOSPADM

## 2023-11-22 RX ORDER — BUPIVACAINE HYDROCHLORIDE AND EPINEPHRINE 5; .0091 MG/ML; MG/ML
INJECTION, SOLUTION DENTAL; INFILTRATION PRN
Status: DISCONTINUED | OUTPATIENT
Start: 2023-11-22 | End: 2023-11-22 | Stop reason: ALTCHOICE

## 2023-11-22 RX ORDER — PROPOFOL 10 MG/ML
INJECTION, EMULSION INTRAVENOUS CONTINUOUS PRN
Status: DISCONTINUED | OUTPATIENT
Start: 2023-11-22 | End: 2023-11-22 | Stop reason: SDUPTHER

## 2023-11-22 RX ORDER — MIDAZOLAM HYDROCHLORIDE 1 MG/ML
INJECTION INTRAMUSCULAR; INTRAVENOUS PRN
Status: DISCONTINUED | OUTPATIENT
Start: 2023-11-22 | End: 2023-11-22 | Stop reason: SDUPTHER

## 2023-11-22 RX ADMIN — DEXMEDETOMIDINE 4 MCG: 100 INJECTION, SOLUTION INTRAVENOUS at 10:04

## 2023-11-22 RX ADMIN — Medication 10 MG: at 10:56

## 2023-11-22 RX ADMIN — FAMOTIDINE 20 MG: 10 INJECTION, SOLUTION INTRAVENOUS at 09:18

## 2023-11-22 RX ADMIN — DEXMEDETOMIDINE 4 MCG: 100 INJECTION, SOLUTION INTRAVENOUS at 10:17

## 2023-11-22 RX ADMIN — PROPOFOL 200 MCG/KG/MIN: 10 INJECTION, EMULSION INTRAVENOUS at 09:15

## 2023-11-22 RX ADMIN — DEXMEDETOMIDINE 4 MCG: 100 INJECTION, SOLUTION INTRAVENOUS at 10:22

## 2023-11-22 RX ADMIN — DIPHENHYDRAMINE HYDROCHLORIDE 12.5 MG: 50 INJECTION INTRAMUSCULAR; INTRAVENOUS at 09:18

## 2023-11-22 RX ADMIN — MIDAZOLAM HYDROCHLORIDE 2 MG: 1 INJECTION, SOLUTION INTRAMUSCULAR; INTRAVENOUS at 09:46

## 2023-11-22 RX ADMIN — MIDAZOLAM HYDROCHLORIDE 2 MG: 1 INJECTION, SOLUTION INTRAMUSCULAR; INTRAVENOUS at 09:08

## 2023-11-22 RX ADMIN — FENTANYL CITRATE 50 MCG: 50 INJECTION, SOLUTION INTRAMUSCULAR; INTRAVENOUS at 09:15

## 2023-11-22 RX ADMIN — DEXMEDETOMIDINE 4 MCG: 100 INJECTION, SOLUTION INTRAVENOUS at 10:07

## 2023-11-22 RX ADMIN — FENTANYL CITRATE 50 MCG: 50 INJECTION, SOLUTION INTRAMUSCULAR; INTRAVENOUS at 09:20

## 2023-11-22 RX ADMIN — DEXMEDETOMIDINE 4 MCG: 100 INJECTION, SOLUTION INTRAVENOUS at 10:01

## 2023-11-22 NOTE — ANESTHESIA PRE PROCEDURE
breath sounds clear to auscultation                             Cardiovascular:Negative CV ROS  Exercise tolerance: good (>4 METS),           Rhythm: regular  Rate: normal                    Neuro/Psych:   Negative Neuro/Psych ROS              GI/Hepatic/Renal: Neg GI/Hepatic/Renal ROS            Endo/Other: Negative Endo/Other ROS                    Abdominal:             Vascular: negative vascular ROS. Other Findings:           Anesthesia Plan      TIVA and MAC     ASA 1       Induction: intravenous. Anesthetic plan and risks discussed with patient.         Attending anesthesiologist reviewed and agrees with Preprocedure content                Ashley Hector MD   11/22/2023

## 2023-11-22 NOTE — OP NOTE
Operative Note    [address] [facility], [address]     Patient: Jose Luis Hardwick  YOB: 1970  MRN: [id number]    Date of Procedure: 11/22/2023    Pre-Op Diagnosis Codes:     * Malignant neoplasm of left breast ([address]) [C50.912]    Post-Op Diagnosis: Same       Procedure(s):  LEFT BREAST LUMPECTOMY WITH SENTINEL NODE BIOPSY AND BLUE DYE    Surgeon(s):  Barbara Sim MD    Assistant:   Surgical Assistant: Catrachito FABIAN    Anesthesia: Monitor Anesthesia Care    Estimated Blood Loss (mL): Minimal    Complications: None    Specimens:   ID Type Source Tests Collected by Time Destination   1 : left axillary sentinel nodes Tissue Lymph Node SURGICAL PATHOLOGY Barbara Sim MD 11/22/2023 9984    2 : left breast lumpectomy Tissue Breast SURGICAL PATHOLOGY Barbara Sim MD 11/22/2023 1028    3 : mag seed x2 Tissue Breast SURGICAL PATHOLOGY Barbara iSm MD 11/22/2023 1033        Implants:  * No implants in log *      Drains:   Closed/Suction Drain Left Breast Bulb (Active)   Site Description Clean, dry & intact 11/22/23 1040   Dressing Status New dressing applied 11/22/23 1040       Findings: 2 small sentinel nodes. 2 MAGSEEDS were superficial and came out of the specimen    [place] Node Biopsy for Breast Cancer - Left  Operation performed with curative intent. Yes   Tracer(s) used to identify sentinel nodes in the upfront surgery (non-neoadjuvant) setting (select all that apply). Dye   Tracer(s) used to identify sentinel nodes in the neoadjuvant setting (select all that apply). N/A   All nodes (colored or non-colored) present at the end of a dye-filled lymphatic channel were removed. Yes   All significantly radioactive nodes were removed. N/A   All palpably suspicious nodes were removed. Yes   Biopsy-proven positive nodes marked with clips prior to chemotherapy were identified and removed.  N/A            Detailed Description of Procedure:

## 2023-11-22 NOTE — ANESTHESIA POSTPROCEDURE EVALUATION
Department of Anesthesiology  Postprocedure Note    Patient: Evert Dao  MRN: [id number]  YOB: 1970  Date of evaluation: 11/22/2023      Procedure Summary     Date: 11/22/23 Room / Location: [facility] MAIN OR F4 / [facility] MAIN OR    Anesthesia Start: 5553 Anesthesia Stop: 1108    Procedure: LEFT BREAST LUMPECTOMY WITH SENTINEL NODE BIOPSY AND BLUE DYE (Left: Breast) Diagnosis:       Malignant neoplasm of left breast ([address])      (Malignant neoplasm of left breast ([address]) [C50.912])    Surgeons: Danni Goel MD Responsible Provider: Mary Jones MD    Anesthesia Type: TIVA, MAC ASA Status: 1          Anesthesia Type: No value filed.     Stephanie Phase I: Stephanie Score: 5    Stephanie Phase II: Stephanie Score: 10      Anesthesia Post Evaluation    Patient location during evaluation: PACU  Patient participation: complete - patient participated  Level of consciousness: awake  Airway patency: patent  Nausea & Vomiting: no vomiting and no nausea  Complications: no  Cardiovascular status: hemodynamically stable  Respiratory status: acceptable  Hydration status: stable  Pain management: adequate

## 2023-11-27 ENCOUNTER — NURSE ONLY (OUTPATIENT)
Age: 53
End: 2023-11-27

## 2023-11-27 ENCOUNTER — TELEPHONE (OUTPATIENT)
Age: 53
End: 2023-11-27

## 2023-11-27 VITALS — WEIGHT: 135 LBS | HEIGHT: 64 IN | BODY MASS INDEX: 23.05 KG/M2

## 2023-11-27 DIAGNOSIS — Z09 SURGERY FOLLOW-UP: Primary | ICD-10-CM

## 2023-11-27 DIAGNOSIS — Z17.0 MALIGNANT NEOPLASM OF UPPER-OUTER QUADRANT OF LEFT BREAST IN FEMALE, ESTROGEN RECEPTOR POSITIVE (HCC): ICD-10-CM

## 2023-11-27 DIAGNOSIS — C50.412 MALIGNANT NEOPLASM OF UPPER-OUTER QUADRANT OF LEFT BREAST IN FEMALE, ESTROGEN RECEPTOR POSITIVE (HCC): ICD-10-CM

## 2023-11-27 PROBLEM — C50.912 MALIGNANT NEOPLASM OF LEFT BREAST (HCC): Status: RESOLVED | Noted: 2023-11-13 | Resolved: 2023-11-27

## 2023-11-30 ENCOUNTER — TELEPHONE (OUTPATIENT)
Age: 53
End: 2023-11-30

## 2023-11-30 DIAGNOSIS — Z17.0 MALIGNANT NEOPLASM OF CENTRAL PORTION OF LEFT BREAST IN FEMALE, ESTROGEN RECEPTOR POSITIVE (HCC): Primary | ICD-10-CM

## 2023-11-30 DIAGNOSIS — C50.112 MALIGNANT NEOPLASM OF CENTRAL PORTION OF LEFT BREAST IN FEMALE, ESTROGEN RECEPTOR POSITIVE (HCC): Primary | ICD-10-CM

## 2023-12-01 ENCOUNTER — CLINICAL DOCUMENTATION (OUTPATIENT)
Age: 53
End: 2023-12-01

## 2023-12-04 ENCOUNTER — CLINICAL DOCUMENTATION (OUTPATIENT)
Age: 53
End: 2023-12-04

## 2023-12-04 NOTE — PROGRESS NOTES
The patient's oncotype is pending. [employer] called to verify the patient's birth date. This was completed.

## 2023-12-06 ENCOUNTER — TELEPHONE (OUTPATIENT)
Age: 53
End: 2023-12-06

## 2023-12-06 NOTE — TELEPHONE ENCOUNTER
Your medical oncology appointment with Dr Prakash Jackson is 12/12/2023 @ 1:00 pm    The address is: [address] [facility], [address] [address]  Telephone # is [phone]    Please arrive 15 minutes early.  Bring your ID, Insurance card, and co-pay if needed.    Please call their office if you need to cancel and reschedule.        Your radiation oncologist appointment with Dr Lazo is 12/15/2023 @ 1:30 pm   Please arrive @ 1:00 pm    The address is [address] [facility] [facility] [address], [place] [address]  [phone]          Please bring your ID, insurance card and co-pay if needed.    Please call their office if you need to cancel and reschedule.

## 2023-12-08 ENCOUNTER — CLINICAL DOCUMENTATION (OUTPATIENT)
Age: 53
End: 2023-12-08

## 2023-12-11 ENCOUNTER — TELEPHONE (OUTPATIENT)
Age: 53
End: 2023-12-11

## 2023-12-13 ENCOUNTER — OFFICE VISIT (OUTPATIENT)
Age: 53
End: 2023-12-13
Payer: COMMERCIAL

## 2023-12-13 VITALS
DIASTOLIC BLOOD PRESSURE: 78 MMHG | BODY MASS INDEX: 21.99 KG/M2 | OXYGEN SATURATION: 98 % | SYSTOLIC BLOOD PRESSURE: 115 MMHG | HEART RATE: 73 BPM | HEIGHT: 64 IN | WEIGHT: 128.8 LBS | RESPIRATION RATE: 19 BRPM | TEMPERATURE: 98 F

## 2023-12-13 DIAGNOSIS — H69.91 DYSFUNCTION OF RIGHT EUSTACHIAN TUBE: ICD-10-CM

## 2023-12-13 DIAGNOSIS — Z13.29 SCREENING FOR THYROID DISORDER: ICD-10-CM

## 2023-12-13 DIAGNOSIS — H93.11 TINNITUS OF RIGHT EAR: Primary | ICD-10-CM

## 2023-12-13 DIAGNOSIS — F41.8 SITUATIONAL ANXIETY: ICD-10-CM

## 2023-12-13 DIAGNOSIS — H93.11 TINNITUS OF RIGHT EAR: ICD-10-CM

## 2023-12-13 DIAGNOSIS — H91.91 DECREASED HEARING OF RIGHT EAR: ICD-10-CM

## 2023-12-13 PROCEDURE — 99213 OFFICE O/P EST LOW 20 MIN: CPT | Performed by: INTERNAL MEDICINE

## 2023-12-13 RX ORDER — FLUCONAZOLE 150 MG/1
150 TABLET ORAL
Qty: 2 TABLET | Refills: 0 | Status: SHIPPED | OUTPATIENT
Start: 2023-12-13 | End: 2023-12-21

## 2023-12-13 RX ORDER — PREDNISONE 20 MG/1
TABLET ORAL
Qty: 18 TABLET | Refills: 0 | Status: SHIPPED | OUTPATIENT
Start: 2023-12-13

## 2023-12-14 LAB
T4 FREE SERPL-MCNC: 1 NG/DL (ref 0.8–1.5)
TSH SERPL DL<=0.05 MIU/L-ACNC: 2.35 UIU/ML (ref 0.36–3.74)

## 2023-12-14 NOTE — PROGRESS NOTES
[facility] at [address] [place] [address]  [facility]      RADIATION ONCOLOGY INITIAL CONSULTATION NOTE      Encounter Date: 12/15/23  Patient Name: Filiberto Ruiz  YOB: 1970  Medical Record Number: [id number]  Referring Physician: Yuko Rincon MD  [address] [place] [address]  Primary Care Provider: Beny Naylor MD  Surgical oncologist: Wayne Guzmán  Medical oncologist: Samuel Goins    DIAGNOSIS:       ICD-10-CM    1. Malignant neoplasm of central portion of left breast in female, estrogen receptor positive ([address])  C50.112     Z17.0           STAGING:    Cancer Staging   Breast cancer, [facility])  Staging form: Breast, AJCC 8th Edition  - Pathologic stage from 11/27/2023: Stage IA (pT1c, pN0(sn), cM0, G1, ER+, WV+, HER2+) - Signed by Yuko Rincon MD on 11/27/2023  AJCC Staging has been reviewed      CHIEF COMPLAINT:   LEFT breast cancer. ASSESSMENT:   49 yo F with cT1cN0 LEFT breast ILC, ER+/WV+/HER2 negative status post lumpectomy/SLNB (pT1cN0, grade 1 ILC)      Patient initially with an inverted left nipple which led to a mammogram which discovered a mass and biopsy confirmed malignancy. Hormone receptor positive HER2 negative disease. Elected breast conservation with lumpectomy on 11/22/23 showing pT1cN0, grade 1 ILC, measuring 1.8cm, margins negative, although minute focus is suspicious for margin involvement. Oncotype testing is still pending and she will meet with Dr. Andressa Arce (medical oncology) later this month. I discussed the natural history of breast cancer, including her pathology, stage, and prognosis. She is an anatomic stage IA and a prognostic IA. I then discussed when patients undergo a lumpectomy, adjuvant radiation is generally recommended to reduce the risk of local recurrence and will give the same outcome as an upfront mastectomy (EBCTCG meta-analysis).       For radiation, I recommend 4 weeks (20 treatments)

## 2023-12-15 ENCOUNTER — HOSPITAL ENCOUNTER (OUTPATIENT)
Facility: HOSPITAL | Age: 53
End: 2023-12-15

## 2023-12-15 VITALS
WEIGHT: 130 LBS | HEIGHT: 64 IN | OXYGEN SATURATION: 100 % | HEART RATE: 68 BPM | RESPIRATION RATE: 16 BRPM | BODY MASS INDEX: 22.2 KG/M2 | DIASTOLIC BLOOD PRESSURE: 75 MMHG | SYSTOLIC BLOOD PRESSURE: 122 MMHG

## 2023-12-15 DIAGNOSIS — Z17.0 MALIGNANT NEOPLASM OF CENTRAL PORTION OF LEFT BREAST IN FEMALE, ESTROGEN RECEPTOR POSITIVE (HCC): Primary | ICD-10-CM

## 2023-12-15 DIAGNOSIS — C50.112 MALIGNANT NEOPLASM OF CENTRAL PORTION OF LEFT BREAST IN FEMALE, ESTROGEN RECEPTOR POSITIVE (HCC): Primary | ICD-10-CM

## 2023-12-24 NOTE — PROGRESS NOTES
3/5/2020 3:25 PM Met with pt, family at bedside. Observation notice provided in writing to patient and/or caregiver as well as verbal explanation of the policy. Patients who are in outpatient status also receive the Observation notice. Confirmed pt has great support system at home to assist after discharge. Pt has rx coverage. Family will transport pt home. No discharge needs identified.  DAVON Dejesus
[facility]: MED RECONCILIATION    Comments/Recommendations:   Patient alert and oriented for medication reconciliation, confirmed she does not take any Rx medications, only vitamins. Discussed allergies, Vicodin makes her feel terrible all over-dizzy, shakes, severe nausea-wants to avoid. Confirmed preferred pharmacy as [facility]. Medications added:     fiber    Medications removed:    none    Medications adjusted:    none    Information obtained from: patient     Allergies: Vicodin [hydrocodone-acetaminophen]    Prior to Admission Medications:     Medication Documentation Review Audit       Reviewed by JeremiasJosemanuel Jonas (Pharmacist) on 03/04/20 at 2202      Medication Sig Documenting Provider Last Dose Status Taking?   calcium polycarbophil (FIBER LAXATIVE, CA POLYCARBO,) 625 mg tablet Take 625 mg by mouth daily. Provider, Historical 3/3/2020 Active Yes   multivit-minerals/folic acid (ADULT MULTIVITAMIN GUMMIES PO) Take 1 Cap by mouth daily.  Provider, Historical 3/3/2020 Active Yes                      Thank you,  Josemanuel Acharya, PharmD, BCPS   Contact: 4660
Bedside and Verbal shift change report given to Yola Emanuel (oncoming nurse) by Dolly Sue (offgoing nurse). Report included the following information SBAR, Kardex, Procedure Summary, Intake/Output, Accordion and Recent Results.
Bg Zambrano. Paged Dr. Edilma Calhoun (covering for Dr. Brett Frias). 1931. Dr Edilma Calhoun returned page. Requested nausea prescription be called to patient's pharmacy. He agreed. Information given to Dr. Edilma Calhoun to call prescription in.    2106. Discussed discharge instructions with patient & her . They verbalized understanding. Copy given. Two prescriptions given. ADITYA PAEZ'd. Discharged via wheelchair.
Problem: Pressure Injury - Risk of  Goal: *Prevention of pressure injury  Description  Document Thierry Scale and appropriate interventions in the flowsheet. Outcome: Progressing Towards Goal  Note: Pressure Injury Interventions: Activity Interventions: Increase time out of bed, PT/OT evaluation    Mobility Interventions: HOB 30 degrees or less, PT/OT evaluation    Nutrition Interventions: Document food/fluid/supplement intake, Discuss nutritional consult with provider, Offer support with meals,snacks and hydration                     Problem: Patient Education: Go to Patient Education Activity  Goal: Patient/Family Education  Outcome: Progressing Towards Goal     Problem: Falls - Risk of  Goal: *Absence of Falls  Description  Document Hamilton German Fall Risk and appropriate interventions in the flowsheet.   Outcome: Progressing Towards Goal  Note: Fall Risk Interventions:            Medication Interventions: Patient to call before getting OOB, Teach patient to arise slowly                   Problem: Patient Education: Go to Patient Education Activity  Goal: Patient/Family Education  Outcome: Progressing Towards Goal     Problem: Pain  Goal: *Control of Pain  Outcome: Progressing Towards Goal
Patient ambulatory to the ER c/o right ear bleeding and pain starting 20 minutes. Patient reports that she felt ear pain,  used Qtip on ear and saw blood. Denies fall, use of Qtips often, hearing loss. No blood noted in triage

## 2023-12-27 ENCOUNTER — TELEPHONE (OUTPATIENT)
Facility: HOSPITAL | Age: 53
End: 2023-12-27

## 2023-12-27 ENCOUNTER — INITIAL CONSULT (OUTPATIENT)
Age: 53
End: 2023-12-27
Payer: COMMERCIAL

## 2023-12-27 VITALS
OXYGEN SATURATION: 99 % | HEIGHT: 64 IN | HEART RATE: 81 BPM | TEMPERATURE: 98 F | WEIGHT: 131 LBS | BODY MASS INDEX: 22.36 KG/M2 | SYSTOLIC BLOOD PRESSURE: 118 MMHG | RESPIRATION RATE: 16 BRPM | DIASTOLIC BLOOD PRESSURE: 69 MMHG

## 2023-12-27 DIAGNOSIS — Z17.0 MALIGNANT NEOPLASM OF UPPER-OUTER QUADRANT OF LEFT BREAST IN FEMALE, ESTROGEN RECEPTOR POSITIVE (HCC): Primary | ICD-10-CM

## 2023-12-27 DIAGNOSIS — C50.412 MALIGNANT NEOPLASM OF UPPER-OUTER QUADRANT OF LEFT BREAST IN FEMALE, ESTROGEN RECEPTOR POSITIVE (HCC): Primary | ICD-10-CM

## 2023-12-27 PROCEDURE — 99205 OFFICE O/P NEW HI 60 MIN: CPT | Performed by: INTERNAL MEDICINE

## 2023-12-27 RX ORDER — TAMOXIFEN CITRATE 20 MG/1
20 TABLET ORAL DAILY
Qty: 90 TABLET | Refills: 3 | Status: SHIPPED | OUTPATIENT
Start: 2023-12-27

## 2023-12-27 NOTE — PROGRESS NOTES
[facility] [place] at [address], [address] [place] [address]  Kiera Silveiraber: [phone]  F: [phone]      Reason for Visit:   Hadley Rothman is a 48 y.o. female who is seen in consultation at the request of Dr. Brittany Vilchis for evaluation of therapy for breast cancer. Treatment History:   10/17/23 left breast core bx:  ILC, gr 2, 1.3 cm, ER + at 90%, SC + at 90%, HER 2 negative at [facility] 0, ki67 1%  11/22/23 left breast lumpectomy:  ILC, gr 1, 1.8 cm, 0/2 LN, pT1c pN0 cM0; anterior margin +    History of Present Illness:   Pt noticed the L inverted nipple in summer 2023 leading to the path above    FH:  PGF with prostate cancer in his 76s; MGM with pancreatic cancer in her 76s; no breast or ovarian cancer    LMP:  11/2023    Past Medical History:   Diagnosis Date    Appendicitis, acute 3/4/2020    Atypical nevus     right arm    Cochlear hydrops of right ear 11/06/2023    \"like Meniere's without the dizziness\" (*new working diagnosis per ENT\"    Eustachian tube dysfunction     ENT chronic \"crackling in ears\" w swallowing. CT 12/2018 normal    Heart palpitations 11/13/2017    House dust mite allergy 2014    Dr. Sandi Mills    Nephrolithiasis 2001    Pap smear for cervical cancer screening 12/2015    Dr. Octavia Espinal    Scoliosis     mild- adult    Shortened SC interval 11/13/2017    Vertigo 07/2016      Past Surgical History:   Procedure Laterality Date    APPENDECTOMY  03/05/2020    BREAST LUMPECTOMY Left 11/22/2023    LEFT BREAST LUMPECTOMY WITH SENTINEL NODE BIOPSY AND BLUE DYE performed by Raquel Ly MD at [address] [facility]  2002    COLONOSCOPY  10/2022    polyps. repeat 3 years?   Dr. Lisa Mace at [address] AD LEFT Left 11/20/2023    NABIL STEREO GUID EA AD LEFT 11/20/2023 [facility] RAD [address]      Social History     Tobacco Use    Smoking status: Never    Smokeless tobacco: Never   Substance Use Topics    Alcohol use: Yes     Comment:

## 2023-12-28 ENCOUNTER — TELEPHONE (OUTPATIENT)
Age: 53
End: 2023-12-28

## 2023-12-28 NOTE — TELEPHONE ENCOUNTER
12/27/23:  Called [employer] in follow-up to Oncotype result. Per rep, insurance authorization is still pending despite several attempts in contacting pt's insurance provider. Called pt's insurance provider [facility]/) directly and was on hold for over 60 minutes before connecting with a rep. Per first rep, decision will be ready by tomorrow and nothing more she can tell me. I asked to speak to someone who can protentially expedite this. Transferred to nurse , Arleth Vazquez. Was placed on another hold for 10 minutes until a different rep, Jeri, answered. She provided the same information as the first rep, however, made more of an effort to connect me with the  for this authorization. Per Tata Victor is out of office, however the covering nurse will provide assistance in her absence. Left VM for covering nurse . 12/28/23:  Received call first thing this morning from Barbra Hi, nurse  at [facility]. She states the only thing holding up the authorization was the question of whether or not this testing is to determine pt's need for chemo. Provided her the answer and explained that this test's main purpose is to determine need for chemo. Once question was answered, approval code given immediately. Called [employer] and left VM for billing department. No call back a few hours later so called again. Per rep, approval received from [facility] and report will be released. Report to be faxed, printed, and scanned into EMR.            CALE Bedolla, RN, V[facility]  Breast Cancer Nurse Navigator    [address]  [place]  W: [phone]  F: [phone]  [email or web address]   Good Help to Those in [facility]

## 2023-12-28 NOTE — TELEPHONE ENCOUNTER
Called patient to schedule follow up with Dr. Hardy Land. Patient stated that she was not near her calendar and would call back to schedule appt. Return in about 4 months (around 4/27/2024).

## 2024-01-03 ENCOUNTER — CLINICAL DOCUMENTATION (OUTPATIENT)
Facility: HOSPITAL | Age: 54
End: 2024-01-03

## 2024-01-03 NOTE — PROGRESS NOTES
Oncotype returned low risk at 15, thus no chemo therapy needed per Dr. Jackson.  Will proceed with adjuvant radiation scheduling.

## 2024-01-12 ENCOUNTER — HOSPITAL ENCOUNTER (OUTPATIENT)
Facility: HOSPITAL | Age: 54
Discharge: HOME | End: 2024-01-15
Attending: RADIOLOGY

## 2024-01-29 ENCOUNTER — HOSPITAL ENCOUNTER (OUTPATIENT)
Facility: HOSPITAL | Age: 54
Discharge: HOME | End: 2024-02-01
Attending: RADIOLOGY

## 2024-01-29 DIAGNOSIS — C50.112 MALIGNANT NEOPLASM OF CENTRAL PORTION OF LEFT BREAST IN FEMALE, ESTROGEN RECEPTOR POSITIVE (HCC): Primary | ICD-10-CM

## 2024-01-29 DIAGNOSIS — Z17.0 MALIGNANT NEOPLASM OF CENTRAL PORTION OF LEFT BREAST IN FEMALE, ESTROGEN RECEPTOR POSITIVE (HCC): Primary | ICD-10-CM

## 2024-01-29 LAB
RAD ONC ARIA COURSE FIRST TREATMENT DATE: NORMAL
RAD ONC ARIA COURSE ID: NORMAL
RAD ONC ARIA COURSE INTENT: NORMAL
RAD ONC ARIA COURSE LAST TREATMENT DATE: NORMAL
RAD ONC ARIA COURSE SESSION NUMBER: 1
RAD ONC ARIA COURSE START DATE: NORMAL
RAD ONC ARIA COURSE TREATMENT ELAPSED DAYS: 0
RAD ONC ARIA PLAN FRACTIONS TREATED TO DATE: 1
RAD ONC ARIA PLAN ID: NORMAL
RAD ONC ARIA PLAN PRESCRIBED DOSE PER FRACTION: 2.67 GY
RAD ONC ARIA PLAN PRIMARY REFERENCE POINT: NORMAL
RAD ONC ARIA PLAN TOTAL FRACTIONS PRESCRIBED: 16
RAD ONC ARIA PLAN TOTAL PRESCRIBED DOSE: 4272 CGY
RAD ONC ARIA REFERENCE POINT DOSAGE GIVEN TO DATE: 2.67 GY
RAD ONC ARIA REFERENCE POINT DOSAGE GIVEN TO DATE: 2.67 GY
RAD ONC ARIA REFERENCE POINT ID: NORMAL
RAD ONC ARIA REFERENCE POINT ID: NORMAL
RAD ONC ARIA REFERENCE POINT SESSION DOSAGE GIVEN: 2.67 GY
RAD ONC ARIA REFERENCE POINT SESSION DOSAGE GIVEN: 2.67 GY

## 2024-01-29 ASSESSMENT — PAIN SCALES - GENERAL: PAINLEVEL_OUTOF10: 0

## 2024-01-29 ASSESSMENT — PATIENT HEALTH QUESTIONNAIRE - PHQ9
SUM OF ALL RESPONSES TO PHQ9 QUESTIONS 1 & 2: 0
SUM OF ALL RESPONSES TO PHQ QUESTIONS 1-9: 0
1. LITTLE INTEREST OR PLEASURE IN DOING THINGS: 0
SUM OF ALL RESPONSES TO PHQ QUESTIONS 1-9: 0
SUM OF ALL RESPONSES TO PHQ QUESTIONS 1-9: 0
2. FEELING DOWN, DEPRESSED OR HOPELESS: 0
SUM OF ALL RESPONSES TO PHQ QUESTIONS 1-9: 0

## 2024-01-29 NOTE — PROGRESS NOTES
nightly Use in each nostril as directed, Disp: , Rfl:     FIBER PO, Take 2 tablets by mouth daily, Disp: , Rfl:     fluticasone (FLONASE) 50 MCG/ACT nasal spray, 1 spray by Each Nostril route daily, Disp: , Rfl:     ascorbic acid (VITAMIN C) 500 MG tablet, Take 2 tablets by mouth daily as needed, Disp: , Rfl:     loratadine (CLARITIN) 10 MG tablet, Take 1 tablet by mouth daily As needed, Disp: , Rfl:       ASSESSMENT & PLAN:   - Continue radiation treatment as planned  - Treatment setup and plan reviewed. Port images/CBCT images reviewed. Appropriate laboratory work was reviewed.   - Treatment side effects and toxicities reviewed with the patient, and appropriate management was advised.         Carlos Manuel Lazo MD  [facility]  [facility] [address] [address]  P: [phone]  [facility] [address], [place] [address]  P: [phone]  [facility]  [address] [place] [address], [address], [place] [address]  P: [phone]

## 2024-01-30 ENCOUNTER — HOSPITAL ENCOUNTER (OUTPATIENT)
Facility: HOSPITAL | Age: 54
Discharge: HOME | End: 2024-02-02
Attending: RADIOLOGY

## 2024-01-30 LAB
RAD ONC ARIA COURSE FIRST TREATMENT DATE: NORMAL
RAD ONC ARIA COURSE ID: NORMAL
RAD ONC ARIA COURSE INTENT: NORMAL
RAD ONC ARIA COURSE LAST TREATMENT DATE: NORMAL
RAD ONC ARIA COURSE SESSION NUMBER: 2
RAD ONC ARIA COURSE START DATE: NORMAL
RAD ONC ARIA COURSE TREATMENT ELAPSED DAYS: 1
RAD ONC ARIA PLAN FRACTIONS TREATED TO DATE: 2
RAD ONC ARIA PLAN ID: NORMAL
RAD ONC ARIA PLAN PRESCRIBED DOSE PER FRACTION: 2.67 GY
RAD ONC ARIA PLAN PRIMARY REFERENCE POINT: NORMAL
RAD ONC ARIA PLAN TOTAL FRACTIONS PRESCRIBED: 16
RAD ONC ARIA PLAN TOTAL PRESCRIBED DOSE: 4272 CGY
RAD ONC ARIA REFERENCE POINT DOSAGE GIVEN TO DATE: 5.34 GY
RAD ONC ARIA REFERENCE POINT DOSAGE GIVEN TO DATE: 5.34 GY
RAD ONC ARIA REFERENCE POINT ID: NORMAL
RAD ONC ARIA REFERENCE POINT ID: NORMAL
RAD ONC ARIA REFERENCE POINT SESSION DOSAGE GIVEN: 2.67 GY
RAD ONC ARIA REFERENCE POINT SESSION DOSAGE GIVEN: 2.67 GY

## 2024-01-31 ENCOUNTER — HOSPITAL ENCOUNTER (OUTPATIENT)
Facility: HOSPITAL | Age: 54
Discharge: HOME | End: 2024-02-03
Attending: RADIOLOGY

## 2024-01-31 LAB
RAD ONC ARIA COURSE FIRST TREATMENT DATE: NORMAL
RAD ONC ARIA COURSE ID: NORMAL
RAD ONC ARIA COURSE INTENT: NORMAL
RAD ONC ARIA COURSE LAST TREATMENT DATE: NORMAL
RAD ONC ARIA COURSE SESSION NUMBER: 3
RAD ONC ARIA COURSE START DATE: NORMAL
RAD ONC ARIA COURSE TREATMENT ELAPSED DAYS: 2
RAD ONC ARIA PLAN FRACTIONS TREATED TO DATE: 3
RAD ONC ARIA PLAN ID: NORMAL
RAD ONC ARIA PLAN PRESCRIBED DOSE PER FRACTION: 2.67 GY
RAD ONC ARIA PLAN PRIMARY REFERENCE POINT: NORMAL
RAD ONC ARIA PLAN TOTAL FRACTIONS PRESCRIBED: 16
RAD ONC ARIA PLAN TOTAL PRESCRIBED DOSE: 4272 CGY
RAD ONC ARIA REFERENCE POINT DOSAGE GIVEN TO DATE: 8.01 GY
RAD ONC ARIA REFERENCE POINT DOSAGE GIVEN TO DATE: 8.01 GY
RAD ONC ARIA REFERENCE POINT ID: NORMAL
RAD ONC ARIA REFERENCE POINT ID: NORMAL
RAD ONC ARIA REFERENCE POINT SESSION DOSAGE GIVEN: 2.67 GY
RAD ONC ARIA REFERENCE POINT SESSION DOSAGE GIVEN: 2.67 GY

## 2024-02-01 ENCOUNTER — HOSPITAL ENCOUNTER (OUTPATIENT)
Facility: HOSPITAL | Age: 54
Discharge: HOME | End: 2024-02-04
Attending: RADIOLOGY

## 2024-02-01 LAB
RAD ONC ARIA COURSE FIRST TREATMENT DATE: NORMAL
RAD ONC ARIA COURSE ID: NORMAL
RAD ONC ARIA COURSE INTENT: NORMAL
RAD ONC ARIA COURSE LAST TREATMENT DATE: NORMAL
RAD ONC ARIA COURSE SESSION NUMBER: 4
RAD ONC ARIA COURSE START DATE: NORMAL
RAD ONC ARIA COURSE TREATMENT ELAPSED DAYS: 3
RAD ONC ARIA PLAN FRACTIONS TREATED TO DATE: 4
RAD ONC ARIA PLAN ID: NORMAL
RAD ONC ARIA PLAN PRESCRIBED DOSE PER FRACTION: 2.67 GY
RAD ONC ARIA PLAN PRIMARY REFERENCE POINT: NORMAL
RAD ONC ARIA PLAN TOTAL FRACTIONS PRESCRIBED: 16
RAD ONC ARIA PLAN TOTAL PRESCRIBED DOSE: 4272 CGY
RAD ONC ARIA REFERENCE POINT DOSAGE GIVEN TO DATE: 10.68 GY
RAD ONC ARIA REFERENCE POINT DOSAGE GIVEN TO DATE: 10.68 GY
RAD ONC ARIA REFERENCE POINT ID: NORMAL
RAD ONC ARIA REFERENCE POINT ID: NORMAL
RAD ONC ARIA REFERENCE POINT SESSION DOSAGE GIVEN: 2.67 GY
RAD ONC ARIA REFERENCE POINT SESSION DOSAGE GIVEN: 2.67 GY

## 2024-02-02 ENCOUNTER — HOSPITAL ENCOUNTER (OUTPATIENT)
Facility: HOSPITAL | Age: 54
Discharge: HOME | End: 2024-02-05
Attending: RADIOLOGY

## 2024-02-02 LAB
RAD ONC ARIA COURSE FIRST TREATMENT DATE: NORMAL
RAD ONC ARIA COURSE ID: NORMAL
RAD ONC ARIA COURSE INTENT: NORMAL
RAD ONC ARIA COURSE LAST TREATMENT DATE: NORMAL
RAD ONC ARIA COURSE SESSION NUMBER: 5
RAD ONC ARIA COURSE START DATE: NORMAL
RAD ONC ARIA COURSE TREATMENT ELAPSED DAYS: 4
RAD ONC ARIA PLAN FRACTIONS TREATED TO DATE: 5
RAD ONC ARIA PLAN ID: NORMAL
RAD ONC ARIA PLAN PRESCRIBED DOSE PER FRACTION: 2.67 GY
RAD ONC ARIA PLAN PRIMARY REFERENCE POINT: NORMAL
RAD ONC ARIA PLAN TOTAL FRACTIONS PRESCRIBED: 16
RAD ONC ARIA PLAN TOTAL PRESCRIBED DOSE: 4272 CGY
RAD ONC ARIA REFERENCE POINT DOSAGE GIVEN TO DATE: 13.34 GY
RAD ONC ARIA REFERENCE POINT DOSAGE GIVEN TO DATE: 13.35 GY
RAD ONC ARIA REFERENCE POINT ID: NORMAL
RAD ONC ARIA REFERENCE POINT ID: NORMAL
RAD ONC ARIA REFERENCE POINT SESSION DOSAGE GIVEN: 2.67 GY
RAD ONC ARIA REFERENCE POINT SESSION DOSAGE GIVEN: 2.67 GY

## 2024-02-05 ENCOUNTER — HOSPITAL ENCOUNTER (OUTPATIENT)
Facility: HOSPITAL | Age: 54
Discharge: HOME | End: 2024-02-08
Attending: RADIOLOGY

## 2024-02-05 DIAGNOSIS — Z17.0 MALIGNANT NEOPLASM OF CENTRAL PORTION OF LEFT BREAST IN FEMALE, ESTROGEN RECEPTOR POSITIVE (HCC): Primary | ICD-10-CM

## 2024-02-05 DIAGNOSIS — C50.112 MALIGNANT NEOPLASM OF CENTRAL PORTION OF LEFT BREAST IN FEMALE, ESTROGEN RECEPTOR POSITIVE (HCC): Primary | ICD-10-CM

## 2024-02-05 LAB
RAD ONC ARIA COURSE FIRST TREATMENT DATE: NORMAL
RAD ONC ARIA COURSE ID: NORMAL
RAD ONC ARIA COURSE INTENT: NORMAL
RAD ONC ARIA COURSE LAST TREATMENT DATE: NORMAL
RAD ONC ARIA COURSE SESSION NUMBER: 6
RAD ONC ARIA COURSE START DATE: NORMAL
RAD ONC ARIA COURSE TREATMENT ELAPSED DAYS: 7
RAD ONC ARIA PLAN FRACTIONS TREATED TO DATE: 6
RAD ONC ARIA PLAN ID: NORMAL
RAD ONC ARIA PLAN PRESCRIBED DOSE PER FRACTION: 2.67 GY
RAD ONC ARIA PLAN PRIMARY REFERENCE POINT: NORMAL
RAD ONC ARIA PLAN TOTAL FRACTIONS PRESCRIBED: 16
RAD ONC ARIA PLAN TOTAL PRESCRIBED DOSE: 4272 CGY
RAD ONC ARIA REFERENCE POINT DOSAGE GIVEN TO DATE: 16.01 GY
RAD ONC ARIA REFERENCE POINT DOSAGE GIVEN TO DATE: 16.02 GY
RAD ONC ARIA REFERENCE POINT ID: NORMAL
RAD ONC ARIA REFERENCE POINT ID: NORMAL
RAD ONC ARIA REFERENCE POINT SESSION DOSAGE GIVEN: 2.67 GY
RAD ONC ARIA REFERENCE POINT SESSION DOSAGE GIVEN: 2.67 GY

## 2024-02-05 NOTE — PROGRESS NOTES
Cancer [place] at [facility]  [facility]      RADIATION ONCOLOGY WEEKLY PROGRESS NOTE    Encounter Date: 02/05/24   Patient Name: Phuong Sharif  YOB: 1970  Medical Record Number: [id number]    DIAGNOSIS:       ICD-10-CM    1. Malignant neoplasm of central portion of left breast in female, estrogen receptor positive (HCC)  C50.112     Z17.0         STAGING:    Cancer Staging   Breast cancer, left (HCC)  Staging form: Breast, AJCC 8th Edition  - Pathologic stage from 11/27/2023: Stage IA (pT1c, pN0(sn), cM0, G1, ER+, NC+, HER2+) - Signed by Evi Winston MD on 11/27/2023  AJCC Staging has been reviewed    ASSESSMENT:   52 yo F with cT1cN0 LEFT breast ILC, ER+/NC+/HER2 negative status post lumpectomy/SLNB (pT1cN0, grade 1 ILC)       TREATMENT DETAILS:     Treatment Site Dose/Fx (cGy) #Fx Current Dose (cGy) Total Planned Dose (cGy) Start Date End Date   Left breast 267 6/16 1602 4272 1/29/24 02/05/24   Left breast lumpectomy bed boost 250 0/4  1000       No concurrent systemic therapy    SUBJECTIVE   1/29/24: Patient seen today for their weekly on treatment evaluation. First OTV at fraction 1 tolerated well, no issues.  Went over RT schedule and answered questions.    2/5/24: at fraction 6 without dermatitis or fatigue yet.  Went over additional questions.    OBJECTIVE/PHYSICAL EXAM:   VITAL SIGNS: There were no vitals taken for this visit.  Wt Readings from Last 5 Encounters:   12/27/23 59.4 kg (131 lb)   12/15/23 59 kg (130 lb)   12/13/23 58.4 kg (128 lb 12.8 oz)   11/27/23 61.2 kg (135 lb)   11/22/23 61.2 kg (135 lb)          Performance status:  ECOG 0, fully active, able to carry on all predisease activities without restrictions  General: Alert and conversant, in NAD  Skin: no erythema    LABS:  Personally reviewed    MEDICATIONS:    Current Outpatient Medications:     tamoxifen (NOLVADEX) 20 MG tablet, Take 1 tablet by mouth daily, Disp: 90

## 2024-02-06 ENCOUNTER — HOSPITAL ENCOUNTER (OUTPATIENT)
Facility: HOSPITAL | Age: 54
Discharge: HOME | End: 2024-02-09
Attending: RADIOLOGY

## 2024-02-06 LAB
RAD ONC ARIA COURSE FIRST TREATMENT DATE: NORMAL
RAD ONC ARIA COURSE ID: NORMAL
RAD ONC ARIA COURSE INTENT: NORMAL
RAD ONC ARIA COURSE LAST TREATMENT DATE: NORMAL
RAD ONC ARIA COURSE SESSION NUMBER: 7
RAD ONC ARIA COURSE START DATE: NORMAL
RAD ONC ARIA COURSE TREATMENT ELAPSED DAYS: 8
RAD ONC ARIA PLAN FRACTIONS TREATED TO DATE: 7
RAD ONC ARIA PLAN ID: NORMAL
RAD ONC ARIA PLAN PRESCRIBED DOSE PER FRACTION: 2.67 GY
RAD ONC ARIA PLAN PRIMARY REFERENCE POINT: NORMAL
RAD ONC ARIA PLAN TOTAL FRACTIONS PRESCRIBED: 16
RAD ONC ARIA PLAN TOTAL PRESCRIBED DOSE: 4272 CGY
RAD ONC ARIA REFERENCE POINT DOSAGE GIVEN TO DATE: 18.68 GY
RAD ONC ARIA REFERENCE POINT DOSAGE GIVEN TO DATE: 18.69 GY
RAD ONC ARIA REFERENCE POINT ID: NORMAL
RAD ONC ARIA REFERENCE POINT ID: NORMAL
RAD ONC ARIA REFERENCE POINT SESSION DOSAGE GIVEN: 2.67 GY
RAD ONC ARIA REFERENCE POINT SESSION DOSAGE GIVEN: 2.67 GY

## 2024-02-07 ENCOUNTER — HOSPITAL ENCOUNTER (OUTPATIENT)
Facility: HOSPITAL | Age: 54
Discharge: HOME | End: 2024-02-10
Attending: RADIOLOGY

## 2024-02-07 LAB
RAD ONC ARIA COURSE FIRST TREATMENT DATE: NORMAL
RAD ONC ARIA COURSE ID: NORMAL
RAD ONC ARIA COURSE INTENT: NORMAL
RAD ONC ARIA COURSE LAST TREATMENT DATE: NORMAL
RAD ONC ARIA COURSE SESSION NUMBER: 8
RAD ONC ARIA COURSE START DATE: NORMAL
RAD ONC ARIA COURSE TREATMENT ELAPSED DAYS: 9
RAD ONC ARIA PLAN FRACTIONS TREATED TO DATE: 8
RAD ONC ARIA PLAN ID: NORMAL
RAD ONC ARIA PLAN PRESCRIBED DOSE PER FRACTION: 2.67 GY
RAD ONC ARIA PLAN PRIMARY REFERENCE POINT: NORMAL
RAD ONC ARIA PLAN TOTAL FRACTIONS PRESCRIBED: 16
RAD ONC ARIA PLAN TOTAL PRESCRIBED DOSE: 4272 CGY
RAD ONC ARIA REFERENCE POINT DOSAGE GIVEN TO DATE: 21.35 GY
RAD ONC ARIA REFERENCE POINT DOSAGE GIVEN TO DATE: 21.36 GY
RAD ONC ARIA REFERENCE POINT ID: NORMAL
RAD ONC ARIA REFERENCE POINT ID: NORMAL
RAD ONC ARIA REFERENCE POINT SESSION DOSAGE GIVEN: 2.67 GY
RAD ONC ARIA REFERENCE POINT SESSION DOSAGE GIVEN: 2.67 GY

## 2024-02-08 ENCOUNTER — HOSPITAL ENCOUNTER (OUTPATIENT)
Facility: HOSPITAL | Age: 54
Discharge: HOME | End: 2024-02-11
Attending: RADIOLOGY

## 2024-02-08 LAB
RAD ONC ARIA COURSE FIRST TREATMENT DATE: NORMAL
RAD ONC ARIA COURSE ID: NORMAL
RAD ONC ARIA COURSE INTENT: NORMAL
RAD ONC ARIA COURSE LAST TREATMENT DATE: NORMAL
RAD ONC ARIA COURSE SESSION NUMBER: 9
RAD ONC ARIA COURSE START DATE: NORMAL
RAD ONC ARIA COURSE TREATMENT ELAPSED DAYS: 10
RAD ONC ARIA PLAN FRACTIONS TREATED TO DATE: 9
RAD ONC ARIA PLAN ID: NORMAL
RAD ONC ARIA PLAN PRESCRIBED DOSE PER FRACTION: 2.67 GY
RAD ONC ARIA PLAN PRIMARY REFERENCE POINT: NORMAL
RAD ONC ARIA PLAN TOTAL FRACTIONS PRESCRIBED: 16
RAD ONC ARIA PLAN TOTAL PRESCRIBED DOSE: 4272 CGY
RAD ONC ARIA REFERENCE POINT DOSAGE GIVEN TO DATE: 24.02 GY
RAD ONC ARIA REFERENCE POINT DOSAGE GIVEN TO DATE: 24.03 GY
RAD ONC ARIA REFERENCE POINT ID: NORMAL
RAD ONC ARIA REFERENCE POINT ID: NORMAL
RAD ONC ARIA REFERENCE POINT SESSION DOSAGE GIVEN: 2.67 GY
RAD ONC ARIA REFERENCE POINT SESSION DOSAGE GIVEN: 2.67 GY

## 2024-02-09 ENCOUNTER — HOSPITAL ENCOUNTER (OUTPATIENT)
Facility: HOSPITAL | Age: 54
Discharge: HOME | End: 2024-02-12
Attending: RADIOLOGY

## 2024-02-09 LAB
RAD ONC ARIA COURSE FIRST TREATMENT DATE: NORMAL
RAD ONC ARIA COURSE ID: NORMAL
RAD ONC ARIA COURSE INTENT: NORMAL
RAD ONC ARIA COURSE LAST TREATMENT DATE: NORMAL
RAD ONC ARIA COURSE SESSION NUMBER: 10
RAD ONC ARIA COURSE START DATE: NORMAL
RAD ONC ARIA COURSE TREATMENT ELAPSED DAYS: 11
RAD ONC ARIA PLAN FRACTIONS TREATED TO DATE: 10
RAD ONC ARIA PLAN ID: NORMAL
RAD ONC ARIA PLAN PRESCRIBED DOSE PER FRACTION: 2.67 GY
RAD ONC ARIA PLAN PRIMARY REFERENCE POINT: NORMAL
RAD ONC ARIA PLAN TOTAL FRACTIONS PRESCRIBED: 16
RAD ONC ARIA PLAN TOTAL PRESCRIBED DOSE: 4272 CGY
RAD ONC ARIA REFERENCE POINT DOSAGE GIVEN TO DATE: 26.69 GY
RAD ONC ARIA REFERENCE POINT DOSAGE GIVEN TO DATE: 26.7 GY
RAD ONC ARIA REFERENCE POINT ID: NORMAL
RAD ONC ARIA REFERENCE POINT ID: NORMAL
RAD ONC ARIA REFERENCE POINT SESSION DOSAGE GIVEN: 2.67 GY
RAD ONC ARIA REFERENCE POINT SESSION DOSAGE GIVEN: 2.67 GY

## 2024-02-12 ENCOUNTER — HOSPITAL ENCOUNTER (OUTPATIENT)
Facility: HOSPITAL | Age: 54
Discharge: HOME | End: 2024-02-15
Attending: RADIOLOGY

## 2024-02-12 DIAGNOSIS — Z17.0 MALIGNANT NEOPLASM OF CENTRAL PORTION OF LEFT BREAST IN FEMALE, ESTROGEN RECEPTOR POSITIVE (HCC): Primary | ICD-10-CM

## 2024-02-12 DIAGNOSIS — C50.112 MALIGNANT NEOPLASM OF CENTRAL PORTION OF LEFT BREAST IN FEMALE, ESTROGEN RECEPTOR POSITIVE (HCC): Primary | ICD-10-CM

## 2024-02-12 LAB
RAD ONC ARIA COURSE FIRST TREATMENT DATE: NORMAL
RAD ONC ARIA COURSE ID: NORMAL
RAD ONC ARIA COURSE INTENT: NORMAL
RAD ONC ARIA COURSE LAST TREATMENT DATE: NORMAL
RAD ONC ARIA COURSE SESSION NUMBER: 11
RAD ONC ARIA COURSE START DATE: NORMAL
RAD ONC ARIA COURSE TREATMENT ELAPSED DAYS: 14
RAD ONC ARIA PLAN FRACTIONS TREATED TO DATE: 11
RAD ONC ARIA PLAN ID: NORMAL
RAD ONC ARIA PLAN PRESCRIBED DOSE PER FRACTION: 2.67 GY
RAD ONC ARIA PLAN PRIMARY REFERENCE POINT: NORMAL
RAD ONC ARIA PLAN TOTAL FRACTIONS PRESCRIBED: 16
RAD ONC ARIA PLAN TOTAL PRESCRIBED DOSE: 4272 CGY
RAD ONC ARIA REFERENCE POINT DOSAGE GIVEN TO DATE: 29.36 GY
RAD ONC ARIA REFERENCE POINT DOSAGE GIVEN TO DATE: 29.37 GY
RAD ONC ARIA REFERENCE POINT ID: NORMAL
RAD ONC ARIA REFERENCE POINT ID: NORMAL
RAD ONC ARIA REFERENCE POINT SESSION DOSAGE GIVEN: 2.67 GY
RAD ONC ARIA REFERENCE POINT SESSION DOSAGE GIVEN: 2.67 GY

## 2024-02-12 NOTE — PROGRESS NOTES
Cancer [place] at [facility]  [facility]      RADIATION ONCOLOGY WEEKLY PROGRESS NOTE    Encounter Date: 02/12/24   Patient Name: Phuong Sharif  YOB: 1970  Medical Record Number: [id number]    DIAGNOSIS:       ICD-10-CM    1. Malignant neoplasm of central portion of left breast in female, estrogen receptor positive (HCC)  C50.112     Z17.0         STAGING:    Cancer Staging   Breast cancer, left (HCC)  Staging form: Breast, AJCC 8th Edition  - Pathologic stage from 11/27/2023: Stage IA (pT1c, pN0(sn), cM0, G1, ER+, IA+, HER2+) - Signed by Evi Winston MD on 11/27/2023  AJCC Staging has been reviewed    ASSESSMENT:   52 yo F with cT1cN0 LEFT breast ILC, ER+/IA+/HER2 negative status post lumpectomy/SLNB (pT1cN0, grade 1 ILC)     TREATMENT DETAILS:     Treatment Site Dose/Fx (cGy) #Fx Current Dose (cGy) Total Planned Dose (cGy) Start Date End Date   Left breast 267 11/16 2937 4272 1/29/24 02/12/24   Left breast lumpectomy bed boost 250 0/4  1000       No concurrent systemic therapy    SUBJECTIVE   1/29/24: Patient seen today for their weekly on treatment evaluation. First OTV at fraction 1 tolerated well, no issues.  Went over RT schedule and answered questions.    2/5/24: at fraction 6 without dermatitis or fatigue yet.  Went over additional questions.    02/12/24: at fraction 11, mild erythema noted over the left breast without desquamation. Has noticed pruritus for which she's using hydrocortisone cream to good effect, doesn't require anything stronger yet. Using moisturizers appropriately.    OBJECTIVE/PHYSICAL EXAM:   VITAL SIGNS: There were no vitals taken for this visit.  Wt Readings from Last 5 Encounters:   12/27/23 59.4 kg (131 lb)   12/15/23 59 kg (130 lb)   12/13/23 58.4 kg (128 lb 12.8 oz)   11/27/23 61.2 kg (135 lb)   11/22/23 61.2 kg (135 lb)   Pain Level: 0      Performance status:  ECOG 0, fully active, able to carry on all

## 2024-02-13 ENCOUNTER — HOSPITAL ENCOUNTER (OUTPATIENT)
Facility: HOSPITAL | Age: 54
Discharge: HOME | End: 2024-02-16
Attending: RADIOLOGY

## 2024-02-13 LAB
RAD ONC ARIA COURSE FIRST TREATMENT DATE: NORMAL
RAD ONC ARIA COURSE ID: NORMAL
RAD ONC ARIA COURSE INTENT: NORMAL
RAD ONC ARIA COURSE LAST TREATMENT DATE: NORMAL
RAD ONC ARIA COURSE SESSION NUMBER: 12
RAD ONC ARIA COURSE START DATE: NORMAL
RAD ONC ARIA COURSE TREATMENT ELAPSED DAYS: 15
RAD ONC ARIA PLAN FRACTIONS TREATED TO DATE: 12
RAD ONC ARIA PLAN ID: NORMAL
RAD ONC ARIA PLAN PRESCRIBED DOSE PER FRACTION: 2.67 GY
RAD ONC ARIA PLAN PRIMARY REFERENCE POINT: NORMAL
RAD ONC ARIA PLAN TOTAL FRACTIONS PRESCRIBED: 16
RAD ONC ARIA PLAN TOTAL PRESCRIBED DOSE: 4272 CGY
RAD ONC ARIA REFERENCE POINT DOSAGE GIVEN TO DATE: 32.03 GY
RAD ONC ARIA REFERENCE POINT DOSAGE GIVEN TO DATE: 32.04 GY
RAD ONC ARIA REFERENCE POINT ID: NORMAL
RAD ONC ARIA REFERENCE POINT ID: NORMAL
RAD ONC ARIA REFERENCE POINT SESSION DOSAGE GIVEN: 2.67 GY
RAD ONC ARIA REFERENCE POINT SESSION DOSAGE GIVEN: 2.67 GY

## 2024-02-14 ENCOUNTER — HOSPITAL ENCOUNTER (OUTPATIENT)
Facility: HOSPITAL | Age: 54
Discharge: HOME | End: 2024-02-17
Attending: RADIOLOGY

## 2024-02-14 LAB
RAD ONC ARIA COURSE FIRST TREATMENT DATE: NORMAL
RAD ONC ARIA COURSE ID: NORMAL
RAD ONC ARIA COURSE INTENT: NORMAL
RAD ONC ARIA COURSE LAST TREATMENT DATE: NORMAL
RAD ONC ARIA COURSE SESSION NUMBER: 13
RAD ONC ARIA COURSE START DATE: NORMAL
RAD ONC ARIA COURSE TREATMENT ELAPSED DAYS: 16
RAD ONC ARIA PLAN FRACTIONS TREATED TO DATE: 13
RAD ONC ARIA PLAN ID: NORMAL
RAD ONC ARIA PLAN PRESCRIBED DOSE PER FRACTION: 2.67 GY
RAD ONC ARIA PLAN PRIMARY REFERENCE POINT: NORMAL
RAD ONC ARIA PLAN TOTAL FRACTIONS PRESCRIBED: 16
RAD ONC ARIA PLAN TOTAL PRESCRIBED DOSE: 4272 CGY
RAD ONC ARIA REFERENCE POINT DOSAGE GIVEN TO DATE: 34.7 GY
RAD ONC ARIA REFERENCE POINT DOSAGE GIVEN TO DATE: 34.71 GY
RAD ONC ARIA REFERENCE POINT ID: NORMAL
RAD ONC ARIA REFERENCE POINT ID: NORMAL
RAD ONC ARIA REFERENCE POINT SESSION DOSAGE GIVEN: 2.67 GY
RAD ONC ARIA REFERENCE POINT SESSION DOSAGE GIVEN: 2.67 GY

## 2024-02-15 ENCOUNTER — HOSPITAL ENCOUNTER (OUTPATIENT)
Facility: HOSPITAL | Age: 54
Discharge: HOME | End: 2024-02-18
Attending: RADIOLOGY

## 2024-02-15 LAB
RAD ONC ARIA COURSE FIRST TREATMENT DATE: NORMAL
RAD ONC ARIA COURSE ID: NORMAL
RAD ONC ARIA COURSE INTENT: NORMAL
RAD ONC ARIA COURSE LAST TREATMENT DATE: NORMAL
RAD ONC ARIA COURSE SESSION NUMBER: 14
RAD ONC ARIA COURSE START DATE: NORMAL
RAD ONC ARIA COURSE TREATMENT ELAPSED DAYS: 17
RAD ONC ARIA PLAN FRACTIONS TREATED TO DATE: 14
RAD ONC ARIA PLAN ID: NORMAL
RAD ONC ARIA PLAN PRESCRIBED DOSE PER FRACTION: 2.67 GY
RAD ONC ARIA PLAN PRIMARY REFERENCE POINT: NORMAL
RAD ONC ARIA PLAN TOTAL FRACTIONS PRESCRIBED: 16
RAD ONC ARIA PLAN TOTAL PRESCRIBED DOSE: 4272 CGY
RAD ONC ARIA REFERENCE POINT DOSAGE GIVEN TO DATE: 37.37 GY
RAD ONC ARIA REFERENCE POINT DOSAGE GIVEN TO DATE: 37.38 GY
RAD ONC ARIA REFERENCE POINT ID: NORMAL
RAD ONC ARIA REFERENCE POINT ID: NORMAL
RAD ONC ARIA REFERENCE POINT SESSION DOSAGE GIVEN: 2.67 GY
RAD ONC ARIA REFERENCE POINT SESSION DOSAGE GIVEN: 2.67 GY

## 2024-02-16 ENCOUNTER — HOSPITAL ENCOUNTER (OUTPATIENT)
Facility: HOSPITAL | Age: 54
Discharge: HOME | End: 2024-02-19
Attending: RADIOLOGY

## 2024-02-16 LAB
RAD ONC ARIA COURSE FIRST TREATMENT DATE: NORMAL
RAD ONC ARIA COURSE ID: NORMAL
RAD ONC ARIA COURSE INTENT: NORMAL
RAD ONC ARIA COURSE LAST TREATMENT DATE: NORMAL
RAD ONC ARIA COURSE SESSION NUMBER: 15
RAD ONC ARIA COURSE START DATE: NORMAL
RAD ONC ARIA COURSE TREATMENT ELAPSED DAYS: 18
RAD ONC ARIA PLAN FRACTIONS TREATED TO DATE: 15
RAD ONC ARIA PLAN ID: NORMAL
RAD ONC ARIA PLAN PRESCRIBED DOSE PER FRACTION: 2.67 GY
RAD ONC ARIA PLAN PRIMARY REFERENCE POINT: NORMAL
RAD ONC ARIA PLAN TOTAL FRACTIONS PRESCRIBED: 16
RAD ONC ARIA PLAN TOTAL PRESCRIBED DOSE: 4272 CGY
RAD ONC ARIA REFERENCE POINT DOSAGE GIVEN TO DATE: 40.03 GY
RAD ONC ARIA REFERENCE POINT DOSAGE GIVEN TO DATE: 40.05 GY
RAD ONC ARIA REFERENCE POINT ID: NORMAL
RAD ONC ARIA REFERENCE POINT ID: NORMAL
RAD ONC ARIA REFERENCE POINT SESSION DOSAGE GIVEN: 2.67 GY
RAD ONC ARIA REFERENCE POINT SESSION DOSAGE GIVEN: 2.67 GY

## 2024-02-16 RX ORDER — MOMETASONE FUROATE 1 MG/G
CREAM TOPICAL
Qty: 45 G | Refills: 2 | Status: SHIPPED | OUTPATIENT
Start: 2024-02-16

## 2024-02-19 ENCOUNTER — HOSPITAL ENCOUNTER (OUTPATIENT)
Facility: HOSPITAL | Age: 54
Discharge: HOME | End: 2024-02-22
Attending: RADIOLOGY

## 2024-02-19 DIAGNOSIS — Z17.0 MALIGNANT NEOPLASM OF CENTRAL PORTION OF LEFT BREAST IN FEMALE, ESTROGEN RECEPTOR POSITIVE (HCC): Primary | ICD-10-CM

## 2024-02-19 DIAGNOSIS — C50.112 MALIGNANT NEOPLASM OF CENTRAL PORTION OF LEFT BREAST IN FEMALE, ESTROGEN RECEPTOR POSITIVE (HCC): Primary | ICD-10-CM

## 2024-02-19 LAB
RAD ONC ARIA COURSE FIRST TREATMENT DATE: NORMAL
RAD ONC ARIA COURSE ID: NORMAL
RAD ONC ARIA COURSE INTENT: NORMAL
RAD ONC ARIA COURSE LAST TREATMENT DATE: NORMAL
RAD ONC ARIA COURSE SESSION NUMBER: 16
RAD ONC ARIA COURSE START DATE: NORMAL
RAD ONC ARIA COURSE TREATMENT ELAPSED DAYS: 21
RAD ONC ARIA PLAN FRACTIONS TREATED TO DATE: 16
RAD ONC ARIA PLAN ID: NORMAL
RAD ONC ARIA PLAN PRESCRIBED DOSE PER FRACTION: 2.67 GY
RAD ONC ARIA PLAN PRIMARY REFERENCE POINT: NORMAL
RAD ONC ARIA PLAN TOTAL FRACTIONS PRESCRIBED: 16
RAD ONC ARIA PLAN TOTAL PRESCRIBED DOSE: 4272 CGY
RAD ONC ARIA REFERENCE POINT DOSAGE GIVEN TO DATE: 42.7 GY
RAD ONC ARIA REFERENCE POINT DOSAGE GIVEN TO DATE: 42.72 GY
RAD ONC ARIA REFERENCE POINT ID: NORMAL
RAD ONC ARIA REFERENCE POINT ID: NORMAL
RAD ONC ARIA REFERENCE POINT SESSION DOSAGE GIVEN: 2.67 GY
RAD ONC ARIA REFERENCE POINT SESSION DOSAGE GIVEN: 2.67 GY

## 2024-02-19 ASSESSMENT — PAIN SCALES - GENERAL: PAINLEVEL_OUTOF10: 0

## 2024-02-19 NOTE — PROGRESS NOTES
Cancer [place] at [facility]  [facility]      RADIATION ONCOLOGY WEEKLY PROGRESS NOTE    Encounter Date: 02/19/24   Patient Name: Phuong Sharif  YOB: 1970  Medical Record Number: [id number]    DIAGNOSIS:       ICD-10-CM    1. Malignant neoplasm of central portion of left breast in female, estrogen receptor positive (HCC)  C50.112     Z17.0         STAGING:    Cancer Staging   Breast cancer, left (HCC)  Staging form: Breast, AJCC 8th Edition  - Pathologic stage from 11/27/2023: Stage IA (pT1c, pN0(sn), cM0, G1, ER+, OK+, HER2+) - Signed by Evi Winston MD on 11/27/2023  AJCC Staging has been reviewed    ASSESSMENT:   52 yo F with cT1cN0 LEFT breast ILC, ER+/OK+/HER2 negative status post lumpectomy/SLNB (pT1cN0, grade 1 ILC)     TREATMENT DETAILS:     Treatment Site Dose/Fx (cGy) #Fx Current Dose (cGy) Total Planned Dose (cGy) Start Date End Date   Left breast 267 16/16 4272 4272 1/29/24 02/19/24   Left breast lumpectomy bed boost 250 0/4  1000       No concurrent systemic therapy    SUBJECTIVE   1/29/24: Patient seen today for their weekly on treatment evaluation. First OTV at fraction 1 tolerated well, no issues.  Went over RT schedule and answered questions.    2/5/24: at fraction 6 without dermatitis or fatigue yet.  Went over additional questions.    02/12/24: at fraction 11, mild erythema noted over the left breast without desquamation. Has noticed pruritus for which she's using hydrocortisone cream to good effect, doesn't require anything stronger yet. Using moisturizers appropriately.    2/19/24: at fraction 16 doing well. Mild dermatitis with pruritus, controlled with mometasone cream.  No significant erythema and no desquamation. Starts boost tomorrow and completes tx Fri. Will f/u in 3 months.    OBJECTIVE/PHYSICAL EXAM:   VITAL SIGNS: There were no vitals taken for this visit.  Wt Readings from Last 5 Encounters:   12/27/23 59.4 kg (131

## 2024-02-20 ENCOUNTER — HOSPITAL ENCOUNTER (OUTPATIENT)
Facility: HOSPITAL | Age: 54
Discharge: HOME | End: 2024-02-23
Attending: RADIOLOGY

## 2024-02-20 LAB
RAD ONC ARIA COURSE FIRST TREATMENT DATE: NORMAL
RAD ONC ARIA COURSE ID: NORMAL
RAD ONC ARIA COURSE INTENT: NORMAL
RAD ONC ARIA COURSE LAST TREATMENT DATE: NORMAL
RAD ONC ARIA COURSE SESSION NUMBER: 17
RAD ONC ARIA COURSE START DATE: NORMAL
RAD ONC ARIA COURSE TREATMENT ELAPSED DAYS: 22
RAD ONC ARIA PLAN FRACTIONS TREATED TO DATE: 1
RAD ONC ARIA PLAN ID: NORMAL
RAD ONC ARIA PLAN PRESCRIBED DOSE PER FRACTION: 2.5 GY
RAD ONC ARIA PLAN PRIMARY REFERENCE POINT: NORMAL
RAD ONC ARIA PLAN TOTAL FRACTIONS PRESCRIBED: 4
RAD ONC ARIA PLAN TOTAL PRESCRIBED DOSE: 1000 CGY
RAD ONC ARIA REFERENCE POINT DOSAGE GIVEN TO DATE: 2.5 GY
RAD ONC ARIA REFERENCE POINT ID: NORMAL
RAD ONC ARIA REFERENCE POINT SESSION DOSAGE GIVEN: 2.5 GY

## 2024-02-21 ENCOUNTER — HOSPITAL ENCOUNTER (OUTPATIENT)
Facility: HOSPITAL | Age: 54
Discharge: HOME | End: 2024-02-24
Attending: RADIOLOGY

## 2024-02-21 LAB
RAD ONC ARIA COURSE FIRST TREATMENT DATE: NORMAL
RAD ONC ARIA COURSE ID: NORMAL
RAD ONC ARIA COURSE INTENT: NORMAL
RAD ONC ARIA COURSE LAST TREATMENT DATE: NORMAL
RAD ONC ARIA COURSE SESSION NUMBER: 18
RAD ONC ARIA COURSE START DATE: NORMAL
RAD ONC ARIA COURSE TREATMENT ELAPSED DAYS: 23
RAD ONC ARIA PLAN FRACTIONS TREATED TO DATE: 2
RAD ONC ARIA PLAN ID: NORMAL
RAD ONC ARIA PLAN PRESCRIBED DOSE PER FRACTION: 2.5 GY
RAD ONC ARIA PLAN PRIMARY REFERENCE POINT: NORMAL
RAD ONC ARIA PLAN TOTAL FRACTIONS PRESCRIBED: 4
RAD ONC ARIA PLAN TOTAL PRESCRIBED DOSE: 1000 CGY
RAD ONC ARIA REFERENCE POINT DOSAGE GIVEN TO DATE: 5 GY
RAD ONC ARIA REFERENCE POINT ID: NORMAL
RAD ONC ARIA REFERENCE POINT SESSION DOSAGE GIVEN: 2.5 GY

## 2024-02-22 ENCOUNTER — HOSPITAL ENCOUNTER (OUTPATIENT)
Facility: HOSPITAL | Age: 54
Discharge: HOME | End: 2024-02-25
Attending: RADIOLOGY

## 2024-02-22 LAB
RAD ONC ARIA COURSE FIRST TREATMENT DATE: NORMAL
RAD ONC ARIA COURSE ID: NORMAL
RAD ONC ARIA COURSE INTENT: NORMAL
RAD ONC ARIA COURSE LAST TREATMENT DATE: NORMAL
RAD ONC ARIA COURSE SESSION NUMBER: 19
RAD ONC ARIA COURSE START DATE: NORMAL
RAD ONC ARIA COURSE TREATMENT ELAPSED DAYS: 24
RAD ONC ARIA PLAN FRACTIONS TREATED TO DATE: 3
RAD ONC ARIA PLAN ID: NORMAL
RAD ONC ARIA PLAN PRESCRIBED DOSE PER FRACTION: 2.5 GY
RAD ONC ARIA PLAN PRIMARY REFERENCE POINT: NORMAL
RAD ONC ARIA PLAN TOTAL FRACTIONS PRESCRIBED: 4
RAD ONC ARIA PLAN TOTAL PRESCRIBED DOSE: 1000 CGY
RAD ONC ARIA REFERENCE POINT DOSAGE GIVEN TO DATE: 7.5 GY
RAD ONC ARIA REFERENCE POINT ID: NORMAL
RAD ONC ARIA REFERENCE POINT SESSION DOSAGE GIVEN: 2.5 GY

## 2024-02-23 ENCOUNTER — HOSPITAL ENCOUNTER (OUTPATIENT)
Facility: HOSPITAL | Age: 54
Discharge: HOME | End: 2024-02-26
Attending: RADIOLOGY

## 2024-02-23 ENCOUNTER — CLINICAL DOCUMENTATION (OUTPATIENT)
Facility: HOSPITAL | Age: 54
End: 2024-02-23

## 2024-02-23 LAB
RAD ONC ARIA COURSE FIRST TREATMENT DATE: NORMAL
RAD ONC ARIA COURSE ID: NORMAL
RAD ONC ARIA COURSE INTENT: NORMAL
RAD ONC ARIA COURSE LAST TREATMENT DATE: NORMAL
RAD ONC ARIA COURSE SESSION NUMBER: 20
RAD ONC ARIA COURSE START DATE: NORMAL
RAD ONC ARIA COURSE TREATMENT ELAPSED DAYS: 25
RAD ONC ARIA PLAN FRACTIONS TREATED TO DATE: 4
RAD ONC ARIA PLAN ID: NORMAL
RAD ONC ARIA PLAN PRESCRIBED DOSE PER FRACTION: 2.5 GY
RAD ONC ARIA PLAN PRIMARY REFERENCE POINT: NORMAL
RAD ONC ARIA PLAN TOTAL FRACTIONS PRESCRIBED: 4
RAD ONC ARIA PLAN TOTAL PRESCRIBED DOSE: 1000 CGY
RAD ONC ARIA REFERENCE POINT DOSAGE GIVEN TO DATE: 10 GY
RAD ONC ARIA REFERENCE POINT ID: NORMAL
RAD ONC ARIA REFERENCE POINT SESSION DOSAGE GIVEN: 2.5 GY

## 2024-02-23 NOTE — PROGRESS NOTES
Cancer [place] at [facility]  [facility]      RADIATION ONCOLOGY CLINICAL END OF TREATMENT NOTE    Encounter Date: 02/23/24   Patient Name: Phuong Sharif  YOB: 1970  Medical Record Number: [id number]    DIAGNOSIS:       ICD-10-CM      1. Malignant neoplasm of central portion of left breast in female, estrogen receptor positive (HCC)  C50.112       Z17.0           STAGING:    Cancer Staging   Breast cancer, left (HCC)  Staging form: Breast, AJCC 8th Edition  - Pathologic stage from 11/27/2023: Stage IA (pT1c, pN0(sn), cM0, G1, ER+, MN+, HER2+) - Signed by Evi Winston MD on 11/27/2023  AJCC Staging has been reviewed      ASSESSMENT:   55 yo F with cT1cN0 LEFT breast ILC, ER+/MN+/HER2 negative status post lumpectomy/SLNB (pT1cN0, grade 1 ILC)       TREATMENT SUMMARY:     Treatment Site Modality Dose/Fx (cGy) #Fx Total Dose (cGy) Start Date End Date Elapsed Days   Left breast 3D- 92 4900 1/29/24 2/19/24 22   Left breast lump bed boost en face Electron 250 4 1000 2/20/24 2/23/24 4       CONCURRENT THERAPY: None    TREATMENT RESPONSE:  Treatment completed as planned      MEDICATIONS:     Current Outpatient Medications:     mometasone (ELOCON) 0.1 % cream, Apply topically 1-2 times a day., Disp: 45 g, Rfl: 2    tamoxifen (NOLVADEX) 20 MG tablet, Take 1 tablet by mouth daily, Disp: 90 tablet, Rfl: 3    azelastine (ASTELIN) 0.1 % nasal spray, 1 spray by Nasal route nightly Use in each nostril as directed, Disp: , Rfl:     FIBER PO, Take 2 tablets by mouth daily, Disp: , Rfl:     fluticasone (FLONASE) 50 MCG/ACT nasal spray, 1 spray by Each Nostril route daily, Disp: , Rfl:     ascorbic acid (VITAMIN C) 500 MG tablet, Take 2 tablets by mouth daily as needed, Disp: , Rfl:     loratadine (CLARITIN) 10 MG tablet, Take 1 tablet by mouth daily As needed, Disp: , Rfl:       UNDER-TREATMENT COURSE SUMMARY:   Overall tolerated treatment well.  Experienced

## 2024-04-23 ENCOUNTER — TELEPHONE (OUTPATIENT)
Age: 54
End: 2024-04-23

## 2024-04-23 DIAGNOSIS — Z85.3 HISTORY OF BREAST CANCER: Primary | ICD-10-CM

## 2024-04-23 NOTE — TELEPHONE ENCOUNTER
I spoke with patient regarding scheduling her BILATERAL mammogram in October, per Dr. Evi Winston, as she would be due for her post-radiation mammogram at the end of August. Patient also reported her left breast is tender to touch underneath. She reports that it used to be deep pain but now its just tender to the touch. She reports having redness and hives yesterday but those have both subsided. I encouraged patient to follow up with her radiation oncologist as it sounds like her symptoms are radiation related and [facility] if the redness returns. Patient verbalized understanding and was appreciative of call

## 2024-04-23 NOTE — TELEPHONE ENCOUNTER
I left a message for the patient to discuss her breast concerns and scheduling bilateral mammogram for October.

## 2024-04-30 ENCOUNTER — OFFICE VISIT (OUTPATIENT)
Age: 54
End: 2024-04-30
Payer: COMMERCIAL

## 2024-04-30 VITALS
DIASTOLIC BLOOD PRESSURE: 69 MMHG | SYSTOLIC BLOOD PRESSURE: 125 MMHG | RESPIRATION RATE: 16 BRPM | HEART RATE: 64 BPM | OXYGEN SATURATION: 99 % | BODY MASS INDEX: 21.8 KG/M2 | TEMPERATURE: 97.9 F | WEIGHT: 127 LBS

## 2024-04-30 DIAGNOSIS — Z17.0 MALIGNANT NEOPLASM OF UPPER-OUTER QUADRANT OF LEFT BREAST IN FEMALE, ESTROGEN RECEPTOR POSITIVE (HCC): Primary | ICD-10-CM

## 2024-04-30 DIAGNOSIS — R07.81 RIB PAIN ON LEFT SIDE: ICD-10-CM

## 2024-04-30 DIAGNOSIS — C50.412 MALIGNANT NEOPLASM OF UPPER-OUTER QUADRANT OF LEFT BREAST IN FEMALE, ESTROGEN RECEPTOR POSITIVE (HCC): Primary | ICD-10-CM

## 2024-04-30 PROCEDURE — 99214 OFFICE O/P EST MOD 30 MIN: CPT | Performed by: NURSE PRACTITIONER

## 2024-04-30 RX ORDER — TRIAMTERENE AND HYDROCHLOROTHIAZIDE 37.5; 25 MG/1; MG/1
1 TABLET ORAL DAILY
COMMUNITY

## 2024-04-30 NOTE — PROGRESS NOTES
Cancer [place] at [facility]  [address], [address] [place] [address]  W: [phone]  F: [phone]      Reason for Visit:   Phuong Sharif is a 54 y.o. female who is seen in follow up for breast cancer.    Breast Surgeon: Dr. Winston    Treatment History:   10/17/23 left breast core bx:  ILC, gr 2, 1.3 cm, ER + at 90%, MA + at 90%, HER 2 negative at IHC 0, ki67 1%  11/22/23 left breast lumpectomy:  ILC, gr 1, 1.8 cm, 0/2 LN, pT1c pN0 cM0; anterior margin +  Oncotype Dx = 15, RR 4%  1/9/2024 [facility], negative  XRT completed 2/23/24  Tamoxifen 3/2/24 - current    History of Present Illness:   Pt noticed the L inverted nipple in summer 2023 leading to the path above    Interval Hx: Patient presents today for follow up on tamoxifen. She reports gr 1 fatigue, gr 1 hair loss, gr 1 insomnia, gr 1 anxiety, gr 1 pain to left side and hip rated 3/10.     She is having left rib pain that worsens with movement and touch.     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  CT 12/2018 normal    Heart palpitations 11/13/2017    House dust mite allergy 2014    Dr. Antonio    Nephrolithiasis 2001    Pap smear for cervical cancer screening 12/2015    Dr. Julian    Scoliosis     mild- adult    Shortened MA interval 11/13/2017    Vertigo 07/2016      Past Surgical History:   Procedure Laterality Date    APPENDECTOMY  03/05/2020    BREAST LUMPECTOMY Left 11/22/2023    LEFT BREAST LUMPECTOMY WITH SENTINEL NODE BIOPSY AND BLUE DYE performed by Evi Winston MD at [facility] MAIN

## 2024-04-30 NOTE — PROGRESS NOTES
Phuong Sharif is a 54 y.o. female here today to follow up for breast caner    1. Have you been to the ER, urgent care clinic since your last visit?  Hospitalized since your last visit?no    2. Have you seen or consulted any other health care providers outside of the [facility] since your last visit?  Include any pap smears or colon screening. no

## 2024-05-14 ENCOUNTER — OFFICE VISIT (OUTPATIENT)
Age: 54
End: 2024-05-14
Payer: COMMERCIAL

## 2024-05-14 VITALS
WEIGHT: 127.2 LBS | RESPIRATION RATE: 16 BRPM | OXYGEN SATURATION: 98 % | SYSTOLIC BLOOD PRESSURE: 106 MMHG | TEMPERATURE: 97.8 F | DIASTOLIC BLOOD PRESSURE: 68 MMHG | HEIGHT: 64 IN | HEART RATE: 61 BPM | BODY MASS INDEX: 21.72 KG/M2

## 2024-05-14 DIAGNOSIS — R19.5 LOOSE STOOLS: ICD-10-CM

## 2024-05-14 DIAGNOSIS — Z00.00 PREVENTATIVE HEALTH CARE: Primary | ICD-10-CM

## 2024-05-14 DIAGNOSIS — Z00.00 PREVENTATIVE HEALTH CARE: ICD-10-CM

## 2024-05-14 DIAGNOSIS — C50.012 MALIGNANT NEOPLASM OF NIPPLE OF LEFT BREAST IN FEMALE, ESTROGEN RECEPTOR POSITIVE (HCC): ICD-10-CM

## 2024-05-14 DIAGNOSIS — F41.8 OTHER SPECIFIED ANXIETY DISORDERS: ICD-10-CM

## 2024-05-14 DIAGNOSIS — Z17.0 MALIGNANT NEOPLASM OF NIPPLE OF LEFT BREAST IN FEMALE, ESTROGEN RECEPTOR POSITIVE (HCC): ICD-10-CM

## 2024-05-14 DIAGNOSIS — R00.2 HEART PALPITATIONS: ICD-10-CM

## 2024-05-14 LAB
ALBUMIN SERPL-MCNC: 3.8 G/DL (ref 3.5–5)
ALBUMIN/GLOB SERPL: 1.2 (ref 1.1–2.2)
ALP SERPL-CCNC: 53 U/L (ref 45–117)
ALT SERPL-CCNC: 18 U/L (ref 12–78)
ANION GAP SERPL CALC-SCNC: 3 MMOL/L (ref 5–15)
AST SERPL-CCNC: 16 U/L (ref 15–37)
BILIRUB SERPL-MCNC: 0.5 MG/DL (ref 0.2–1)
BUN SERPL-MCNC: 18 MG/DL (ref 6–20)
BUN/CREAT SERPL: 19 (ref 12–20)
CALCIUM SERPL-MCNC: 9.5 MG/DL (ref 8.5–10.1)
CHLORIDE SERPL-SCNC: 108 MMOL/L (ref 97–108)
CHOLEST SERPL-MCNC: 178 MG/DL
CO2 SERPL-SCNC: 28 MMOL/L (ref 21–32)
CREAT SERPL-MCNC: 0.97 MG/DL (ref 0.55–1.02)
ERYTHROCYTE [DISTWIDTH] IN BLOOD BY AUTOMATED COUNT: 12.3 % (ref 11.5–14.5)
GLOBULIN SER CALC-MCNC: 3.1 G/DL (ref 2–4)
GLUCOSE SERPL-MCNC: 90 MG/DL (ref 65–100)
HCT VFR BLD AUTO: 38.6 % (ref 35–47)
HDLC SERPL-MCNC: 71 MG/DL
HDLC SERPL: 2.5 (ref 0–5)
HGB BLD-MCNC: 12.9 G/DL (ref 11.5–16)
LDLC SERPL CALC-MCNC: 84.4 MG/DL (ref 0–100)
MCH RBC QN AUTO: 31.6 PG (ref 26–34)
MCHC RBC AUTO-ENTMCNC: 33.4 G/DL (ref 30–36.5)
MCV RBC AUTO: 94.6 FL (ref 80–99)
NRBC # BLD: 0 K/UL (ref 0–0.01)
NRBC BLD-RTO: 0 PER 100 WBC
PLATELET # BLD AUTO: 168 K/UL (ref 150–400)
PMV BLD AUTO: 12.8 FL (ref 8.9–12.9)
POTASSIUM SERPL-SCNC: 4.4 MMOL/L (ref 3.5–5.1)
PROT SERPL-MCNC: 6.9 G/DL (ref 6.4–8.2)
RBC # BLD AUTO: 4.08 M/UL (ref 3.8–5.2)
SODIUM SERPL-SCNC: 139 MMOL/L (ref 136–145)
TRIGL SERPL-MCNC: 113 MG/DL
VLDLC SERPL CALC-MCNC: 22.6 MG/DL
WBC # BLD AUTO: 7.9 K/UL (ref 3.6–11)

## 2024-05-14 PROCEDURE — 99396 PREV VISIT EST AGE 40-64: CPT | Performed by: INTERNAL MEDICINE

## 2024-05-14 PROCEDURE — 99213 OFFICE O/P EST LOW 20 MIN: CPT | Performed by: INTERNAL MEDICINE

## 2024-05-14 RX ORDER — BUSPIRONE HYDROCHLORIDE 7.5 MG/1
7.5 TABLET ORAL 3 TIMES DAILY PRN
Qty: 60 TABLET | Refills: 0 | Status: SHIPPED | OUTPATIENT
Start: 2024-05-14 | End: 2024-06-13

## 2024-05-14 SDOH — ECONOMIC STABILITY: HOUSING INSECURITY
IN THE LAST 12 MONTHS, WAS THERE A TIME WHEN YOU DID NOT HAVE A STEADY PLACE TO SLEEP OR SLEPT IN A SHELTER (INCLUDING NOW)?: NO

## 2024-05-14 SDOH — ECONOMIC STABILITY: FOOD INSECURITY: WITHIN THE PAST 12 MONTHS, YOU WORRIED THAT YOUR FOOD WOULD RUN OUT BEFORE YOU GOT MONEY TO BUY MORE.: NEVER TRUE

## 2024-05-14 SDOH — ECONOMIC STABILITY: FOOD INSECURITY: WITHIN THE PAST 12 MONTHS, THE FOOD YOU BOUGHT JUST DIDN'T LAST AND YOU DIDN'T HAVE MONEY TO GET MORE.: NEVER TRUE

## 2024-05-14 SDOH — ECONOMIC STABILITY: INCOME INSECURITY: HOW HARD IS IT FOR YOU TO PAY FOR THE VERY BASICS LIKE FOOD, HOUSING, MEDICAL CARE, AND HEATING?: NOT VERY HARD

## 2024-05-14 ASSESSMENT — PATIENT HEALTH QUESTIONNAIRE - PHQ9
SUM OF ALL RESPONSES TO PHQ QUESTIONS 1-9: 0
1. LITTLE INTEREST OR PLEASURE IN DOING THINGS: NOT AT ALL
SUM OF ALL RESPONSES TO PHQ QUESTIONS 1-9: 0
SUM OF ALL RESPONSES TO PHQ9 QUESTIONS 1 & 2: 0
2. FEELING DOWN, DEPRESSED OR HOPELESS: NOT AT ALL

## 2024-05-14 NOTE — PROGRESS NOTES
\"Have you been to the ER, urgent care clinic since your last visit?  Hospitalized since your last visit?\"    YES - When: approximately 7 months ago.  Where and Why: [facility] ER on 11/10/2023.    “Have you seen or consulted any other health care providers outside of [facility] since your last visit?”    NO            Click Here for Release of Records Request

## 2024-05-14 NOTE — PROGRESS NOTES
Phuong Sharif is a 54 y.o. female  Presenting for her annual checkup  Please see attached problem-focused note, if applicable    Reports multiple recent stressors.  Her mother had a stroke last year and is having ongoing cognitive deficits as a result.  She has some stress at work secondary to increased demands as a , working as needed.  In addition, she was diagnosed with breast cancer November 2023.  See below.  She saw me March 2023 for anxiety and was given buspirone 7.5 mg twice daily which she did not start because she was doing okay.  She would like to try it now.  Mild insomnia.  No overt panic attacks.    Breast CA.  Completed XRT and started tamoxifen 3/3/24.  Tolerating fairly well.  No hot flashes. Mild sleep disturbance.  Referred to lymphedema clinic for L arm and chest wall pains s/p XRT.  DEXA pending  10/17/23 left breast core bx:  ILC, gr 2, 1.3 cm, ER + at 90%, MS + at 90%, HER 2 negative at IHC 0, ki67 1%  11/22/23 left breast lumpectomy:  ILC, gr 1, 1.8 cm, 0/2 LN, pT1c pN0 cM0; anterior margin +  Oncotype Dx = 15, RR 4%  1/9/2024 [facility], negative  XRT completed 2/23/24  Tamoxifen 3/2/24 - current    Loose stools. Onset was years ago    Colonoscopy 2022.  Not normal.  Trying fiber.   She reduced sodium in diet due to cochlear hydrops, dizziness.      Wt Readings from Last 3 Encounters:   05/14/24 57.7 kg (127 lb 3.2 oz)   04/30/24 57.6 kg (127 lb)   12/27/23 59.4 kg (131 lb)       Last breast exam: Per cancer specialist.  Last PAP/pelvic: Per GYN  Last colonoscopy: 2022  Last DEXA: Pending  Health Maintenance   Topic Date Due    COVID-19 Vaccine (3 - 2023-24 season) 10/01/2024 (Originally 9/1/2023)    Breast cancer screen  10/13/2024    Cervical cancer screen  11/19/2024    Depression Screen  05/14/2025    Colorectal Cancer Screen  10/14/2025    DTaP/Tdap/Td vaccine (2 - Td or Tdap) 09/21/2026    Lipids  05/14/2029    Flu vaccine  Completed    Shingles vaccine  Completed

## 2024-05-16 LAB
GLIADIN PEPTIDE IGA SER-ACNC: 2 UNITS (ref 0–19)
GLIADIN PEPTIDE IGG SER-ACNC: 2 UNITS (ref 0–19)
IGA SERPL-MCNC: 88 MG/DL (ref 87–352)
TTG IGA SER-ACNC: <2 U/ML (ref 0–3)
TTG IGG SER-ACNC: <2 U/ML (ref 0–5)

## 2024-05-28 ENCOUNTER — HOSPITAL ENCOUNTER (OUTPATIENT)
Facility: HOSPITAL | Age: 54
Discharge: HOME | End: 2024-05-31
Payer: COMMERCIAL

## 2024-05-28 DIAGNOSIS — C50.412 MALIGNANT NEOPLASM OF UPPER-OUTER QUADRANT OF LEFT BREAST IN FEMALE, ESTROGEN RECEPTOR POSITIVE (HCC): ICD-10-CM

## 2024-05-28 DIAGNOSIS — Z17.0 MALIGNANT NEOPLASM OF UPPER-OUTER QUADRANT OF LEFT BREAST IN FEMALE, ESTROGEN RECEPTOR POSITIVE (HCC): ICD-10-CM

## 2024-05-28 DIAGNOSIS — R07.81 RIB PAIN ON LEFT SIDE: ICD-10-CM

## 2024-05-28 PROCEDURE — A9503 TC99M MEDRONATE: HCPCS | Performed by: NURSE PRACTITIONER

## 2024-05-28 PROCEDURE — 78306 BONE IMAGING WHOLE BODY: CPT | Performed by: NURSE PRACTITIONER

## 2024-05-28 PROCEDURE — 3430000000 HC RX DIAGNOSTIC RADIOPHARMACEUTICAL: Performed by: NURSE PRACTITIONER

## 2024-05-28 RX ORDER — TC 99M MEDRONATE 20 MG/10ML
25 INJECTION, POWDER, LYOPHILIZED, FOR SOLUTION INTRAVENOUS
Status: COMPLETED | OUTPATIENT
Start: 2024-05-28 | End: 2024-05-28

## 2024-05-28 RX ADMIN — TC 99M MEDRONATE 25 MILLICURIE: 20 INJECTION, POWDER, LYOPHILIZED, FOR SOLUTION INTRAVENOUS at 10:25

## 2024-05-31 ENCOUNTER — HOSPITAL ENCOUNTER (OUTPATIENT)
Facility: HOSPITAL | Age: 54
End: 2024-05-31
Attending: RADIOLOGY

## 2024-05-31 VITALS
HEART RATE: 68 BPM | DIASTOLIC BLOOD PRESSURE: 76 MMHG | OXYGEN SATURATION: 100 % | HEIGHT: 64 IN | RESPIRATION RATE: 16 BRPM | WEIGHT: 129 LBS | SYSTOLIC BLOOD PRESSURE: 120 MMHG | BODY MASS INDEX: 22.02 KG/M2

## 2024-05-31 DIAGNOSIS — C50.112 MALIGNANT NEOPLASM OF CENTRAL PORTION OF LEFT BREAST IN FEMALE, ESTROGEN RECEPTOR POSITIVE (HCC): Primary | ICD-10-CM

## 2024-05-31 DIAGNOSIS — Z17.0 MALIGNANT NEOPLASM OF CENTRAL PORTION OF LEFT BREAST IN FEMALE, ESTROGEN RECEPTOR POSITIVE (HCC): Primary | ICD-10-CM

## 2024-05-31 ASSESSMENT — PAIN SCALES - GENERAL: PAINLEVEL_OUTOF10: 0

## 2024-05-31 NOTE — PROGRESS NOTES
Cancer [place] at [facility]  [facility]      RADIATION ONCOLOGY FOLLOW-UP VISIT NOTE     Encounter Date: 05/31/24   Patient Name: Phuong Sharif  YOB: 1970  Medical Record Number: [id number]  Referring Physician: Evi Winston MD  [address] [place] [address]  Primary Care Provider: Lacho Alfredo MD  Surgical oncologist: Evi Winston  Medical oncologist: Prakash Jackson    DIAGNOSIS:       ICD-10-CM    1. Malignant neoplasm of central portion of left breast in female, estrogen receptor positive (HCC)  C50.112     Z17.0         STAGING:    Cancer Staging   Breast cancer, left (HCC)  Staging form: Breast, AJCC 8th Edition  - Pathologic stage from 11/27/2023: Stage IA (pT1c, pN0(sn), cM0, G1, ER+, OK+, HER2+) - Signed by Evi Winston MD on 11/27/2023  AJCC Staging has been reviewed      ONCOLOGIC HISTORY:   Patient initially noted inverted LEFT nipple leading to work up.     10/13/23 - diagnostic bilateral mammogram finds architectural distortion in retroareolar region of LEFT breast along with an increase in microcalcifications in inferior/medial left breast. Right breast negative.     Targeted LEFT US shows an irregular mass measuring 0.6 x 1.2 x 1.7cm at 12:00 position of retroareolar region. Also at 7:00 position confirmation of calcifications without a mass. No axillary adenopathy.     10/17/23 - biopsy of LEFT 12:00 mass shows invasive lobular carcinoma measuring 13mm, grade 2, ER+ (90%), OK+ (90%), HER2 negative (0 IHC), Ki67 1%.     11/2/23 - breast MRI confirms irregular spiculated mass at 2:00 position of LEFT breast measuring 2 x 1.7 x 2.1cm with biopsy clip. Also shows regional non mass enhancement in middle/posterior third of inferior LEFT breast 5-7:00 position and measuring 4.4cm corresponding to calcifications. Right breast negative. No adenopathy.     11/22/23 - LEFT lumpectomy/SLNB by Dr. Evi Winston 
Chief Complaint  Urinary Tract Infection (Burning and lower back pain)    Subjective          Coni Steele presents to [facility]    History of Present Illness   patient is here today for complaints of burning when she urinates and lower back pain and she thought she may have a UTI.      Objective   Vital Signs:   BP 92/50 (BP Location: Right arm)   Pulse 80   Temp 96.6 °F (35.9 °C) (Temporal)   Wt 59.9 kg (132 lb)   SpO2 97%   BMI 23.76 kg/m²     Body mass index is 23.76 kg/m².    Review of Systems   Constitutional:  Negative for chills, fatigue and fever.   Respiratory:  Negative for cough and shortness of breath.    Cardiovascular:  Negative for chest pain and palpitations.   Genitourinary:  Positive for dysuria and frequency.   Musculoskeletal:  Positive for back pain. Negative for myalgias.   Neurological:  Negative for dizziness and headache.   Psychiatric/Behavioral:  Negative for depressed mood. The patient is not nervous/anxious.        Past History:  Medical History: has a past medical history of Anxiety, Depression, Diabetes mellitus, and Hypertension.   Surgical History: has a past surgical history that includes Cholecystectomy and Appendectomy.   Family History: family history is not on file.   Social History: reports that she has never smoked. She has never used smokeless tobacco. She reports that she does not drink alcohol and does not use drugs.      Current Outpatient Medications:     Accu-Chek Softclix Lancets lancets, Use as instructed, Disp: 100 each, Rfl: 12    atorvastatin (LIPITOR) 20 MG tablet, Take 1 tablet by mouth Daily., Disp: 90 tablet, Rfl: 1    Blood Glucose Monitoring Suppl (ONE TOUCH ULTRA 2) w/Device kit, , Disp: , Rfl:     Brexpiprazole (Rexulti) 1 MG tablet, Take 1 mg by mouth Daily., Disp: 30 tablet, Rfl: 1    empagliflozin (JARDIANCE) 25 MG tablet tablet, Take 1 tablet by mouth Daily., Disp: 30 tablet, Rfl: 0    escitalopram (LEXAPRO) 5 MG 
tablet, Take 1 tablet by mouth Daily., Disp: 30 tablet, Rfl: 1    glucose blood (Accu-Chek Whit Plus) test strip, Check blood sugar daily, Disp: 30 each, Rfl: 12    hydrOXYzine (ATARAX) 25 MG tablet, Take 1 tablet by mouth At Night As Needed (sleep)., Disp: 30 tablet, Rfl: 0    insulin detemir (Levemir FlexPen) 100 UNIT/ML injection, Inject 10 Units under the skin into the appropriate area as directed Daily With Dinner., Disp: 3 mL, Rfl: 1    lamoTRIgine (LaMICtal) 150 MG tablet, Take 1 tablet by mouth 2 (Two) Times a Day., Disp: 60 tablet, Rfl: 1    lisinopril (PRINIVIL,ZESTRIL) 40 MG tablet, Take 1 tablet by mouth Every Morning., Disp: 90 tablet, Rfl: 3    metFORMIN (GLUCOPHAGE) 500 MG tablet, Take 2 tablets by mouth 2 (Two) Times a Day With Meals., Disp: 360 tablet, Rfl: 0    multivitamin with minerals tablet tablet, Take 1 tablet by mouth Daily., Disp: , Rfl:     ondansetron (ZOFRAN) 4 MG tablet, Take 1 tablet by mouth Every 8 (Eight) Hours As Needed. for nausea, Disp: , Rfl:     polycarbophil 625 MG tablet tablet, Take 1 tablet by mouth Daily As Needed., Disp: , Rfl:     primidone (MYSOLINE) 50 MG tablet, Take 1/2 tab q hs x 7 d, then 1 q hs x 7d, then 2 q hs, Disp: 120 tablet, Rfl: 5    SITagliptin (Januvia) 100 MG tablet, Take 1 tablet by mouth Daily., Disp: 30 tablet, Rfl: 2    cephalexin (Keflex) 250 MG capsule, Take 1 capsule by mouth 3 (Three) Times a Day., Disp: 30 capsule, Rfl: 0    Allergies: Phenothiazines, Shellfish-derived products, Phenylephrine, Indomethacin, Levofloxacin, and Seroquel xr [quetiapine fumarate er]    Physical Exam  Cardiovascular:      Rate and Rhythm: Normal rate and regular rhythm.      Heart sounds: Normal heart sounds.   Pulmonary:      Effort: Pulmonary effort is normal.      Breath sounds: Normal breath sounds.   Abdominal:      General: Bowel sounds are normal.      Palpations: Abdomen is soft.      Tenderness: There is no abdominal tenderness. There is no right CVA 
tenderness, left CVA tenderness, guarding or rebound.   Musculoskeletal:      Lumbar back: Tenderness present. Negative right straight leg raise test and negative left straight leg raise test.   Neurological:      Mental Status: She is alert and oriented to person, place, and time.   Psychiatric:         Behavior: Behavior normal.          Result Review :                   Assessment and Plan    Diagnoses and all orders for this visit:    1. Acute cystitis without hematuria (Primary)  Assessment & Plan:  I am going to treat her based on her symptoms with Keflex, but her Urinalysis was unremarkable, despite the cloudiness of her urine appearance.  Call or return if symptoms persist or worsen.  I told her I thought the back pain was most likely muscular and suggested that she take some Tylenol for that.    Orders:  -     POC Urinalysis Dipstick, Automated    Other orders  -     cephalexin (Keflex) 250 MG capsule; Take 1 capsule by mouth 3 (Three) Times a Day.  Dispense: 30 capsule; Refill: 0        Follow Up   Return if symptoms worsen or fail to improve.  Patient was given instructions and counseling regarding her condition or for health maintenance advice. Please see specific information pulled into the AVS if appropriate.     Kalli Roa PA-C  
no fever and no chills.

## 2024-06-11 ENCOUNTER — TELEPHONE (OUTPATIENT)
Age: 54
End: 2024-06-11

## 2024-06-11 NOTE — TELEPHONE ENCOUNTER
Pt stated she scheduled to have a hysterectomy on 7/2. Pt wanted to know if she can still take Tamoxifen after surgery. Please advise       CB# [phone]

## 2024-06-14 ENCOUNTER — TELEPHONE (OUTPATIENT)
Age: 54
End: 2024-06-14

## 2024-06-14 NOTE — TELEPHONE ENCOUNTER
[facility] [place] at [facility]  [phone]    06/14/24 3:46 PM EDT - Called patient to see if we could set her up for an appointment to speak with Dr. Jackson about possibly switching from Tamoxifen to AI after getting a hysterectomy. Patient is scheduled for August 6th at 9 AM.

## 2024-08-07 ENCOUNTER — TELEPHONE (OUTPATIENT)
Age: 54
End: 2024-08-07

## 2024-08-07 ENCOUNTER — OFFICE VISIT (OUTPATIENT)
Age: 54
End: 2024-08-07
Payer: COMMERCIAL

## 2024-08-07 VITALS
RESPIRATION RATE: 16 BRPM | HEART RATE: 81 BPM | SYSTOLIC BLOOD PRESSURE: 127 MMHG | TEMPERATURE: 97.8 F | WEIGHT: 128 LBS | HEIGHT: 64 IN | BODY MASS INDEX: 21.85 KG/M2 | OXYGEN SATURATION: 97 % | DIASTOLIC BLOOD PRESSURE: 81 MMHG

## 2024-08-07 DIAGNOSIS — C50.412 MALIGNANT NEOPLASM OF UPPER-OUTER QUADRANT OF LEFT BREAST IN FEMALE, ESTROGEN RECEPTOR POSITIVE (HCC): Primary | ICD-10-CM

## 2024-08-07 DIAGNOSIS — Z17.0 MALIGNANT NEOPLASM OF UPPER-OUTER QUADRANT OF LEFT BREAST IN FEMALE, ESTROGEN RECEPTOR POSITIVE (HCC): Primary | ICD-10-CM

## 2024-08-07 DIAGNOSIS — Z78.0 POSTMENOPAUSAL: ICD-10-CM

## 2024-08-07 PROCEDURE — 99214 OFFICE O/P EST MOD 30 MIN: CPT | Performed by: INTERNAL MEDICINE

## 2024-08-07 RX ORDER — BUSPIRONE HYDROCHLORIDE 7.5 MG/1
TABLET ORAL AS NEEDED
COMMUNITY

## 2024-08-07 NOTE — TELEPHONE ENCOUNTER
[place] [place] at [facility]  [phone]    08/07/24 11:32 AM EDT - Called patient back and explained the difference between a whole body bone scan and a DEXA scan. Provided patient with Central Scheduling's number.

## 2024-08-07 NOTE — PROGRESS NOTES
Identified pt with two pt identifiers(name and ). Reviewed record in preparation for visit and have obtained necessary documentation. All patient medications has been reviewed.  Chief Complaint   Patient presents with    Follow-up       Vitals:    24 0857   BP: 127/81   Pulse: 81   Resp: 16   Temp: 97.8 °F (36.6 °C)   SpO2: 97%                   Coordination of Care Questionnaire:   1) Have you been to an emergency room, urgent care, or hospitalized since your last visit?  no    2. Have seen or consulted any other health care provider since your last visit? no            
tolerating well, will continue and likely change to an AI at 2 years    She plans to get bilateral mammogram 10/2024 with Dr. Winston.       Follow-up after early breast cancer was discussed. I recommend follow-up as defined by the American Society of Clinical Oncology and National Comprehensive Cancer Network. This includes a visit to a health care professional every 3-6 months for 3 years, then every 6-12 months for 2 years, and then yearly as well as mammograms yearly.      2. Emotional well being:  She has excellent support and is coping well with her disease    3. Genetic testing:   discussed potential ramifications of a positive test including the potential need for bilateral mastectomies and bilateral oophorectomies and the risk then for her family members to also have a mutation. VUS also discussed.  This was performed 1/9/2024, negative.     4. Left hip pain: bone scan 5/28/24 negative      I appreciate the opportunity to participate in Ms. Phuong Sharif's care.    Signed By: Prakash Jackson MD      No follow-ups on file.

## 2024-08-07 NOTE — TELEPHONE ENCOUNTER
Patient called and stated that she got a bone scan done back in May with Dr. Lazo and wanted to know if the bone density was still needing to be done or if they were the same thing. Requested a call back.       # [phone]

## 2024-08-22 DIAGNOSIS — Z85.3 HISTORY OF BREAST CANCER: Primary | ICD-10-CM

## 2024-09-11 ENCOUNTER — HOSPITAL ENCOUNTER (OUTPATIENT)
Facility: HOSPITAL | Age: 54
Discharge: HOME | End: 2024-09-14
Attending: INTERNAL MEDICINE
Payer: COMMERCIAL

## 2024-09-11 DIAGNOSIS — Z78.0 POSTMENOPAUSAL: ICD-10-CM

## 2024-09-11 PROCEDURE — 77080 DXA BONE DENSITY AXIAL: CPT

## 2024-09-12 ENCOUNTER — CLINICAL DOCUMENTATION (OUTPATIENT)
Age: 54
End: 2024-09-12

## 2024-10-22 ENCOUNTER — HOSPITAL ENCOUNTER (OUTPATIENT)
Facility: HOSPITAL | Age: 54
Discharge: HOME | End: 2024-10-25
Attending: SURGERY
Payer: COMMERCIAL

## 2024-10-22 ENCOUNTER — OFFICE VISIT (OUTPATIENT)
Age: 54
End: 2024-10-22
Payer: COMMERCIAL

## 2024-10-22 VITALS — WEIGHT: 128 LBS | BODY MASS INDEX: 21.85 KG/M2 | HEIGHT: 64 IN

## 2024-10-22 DIAGNOSIS — C50.112 MALIGNANT NEOPLASM OF CENTRAL PORTION OF LEFT BREAST IN FEMALE, ESTROGEN RECEPTOR POSITIVE (HCC): Primary | ICD-10-CM

## 2024-10-22 DIAGNOSIS — Z17.0 MALIGNANT NEOPLASM OF CENTRAL PORTION OF LEFT BREAST IN FEMALE, ESTROGEN RECEPTOR POSITIVE (HCC): Primary | ICD-10-CM

## 2024-10-22 DIAGNOSIS — Z85.3 HISTORY OF BREAST CANCER: ICD-10-CM

## 2024-10-22 PROCEDURE — G0279 TOMOSYNTHESIS, MAMMO: HCPCS

## 2024-10-22 PROCEDURE — 99213 OFFICE O/P EST LOW 20 MIN: CPT | Performed by: SURGERY

## 2024-10-22 NOTE — PROGRESS NOTES
HISTORY OF PRESENT ILLNESS  Phuong Sharif is a 54 y.o. female     HPI ESTABLISHED Patient here for annual follow up.  She had some ROM after XRT, improved with self-exercise.  Tolerating Tamoxifen.  Some hot flashes at night.   7/2024, JOYCE BSO    10/2023 LEFT lumpectomy 1.8cm invasive ductal carcinoma, grade 2,  0/2 nodes, ER 90%, MI 90%, Her2 neg, Ki 1% (calcs were mild columnar cell atypia)  XRT 2/2024, tamoxifen  Oncotype 15    Breast imaging-   Mammogram Result (most recent):  [facility] DANIELITO DIGITAL DIAGNOSTIC BILATERAL 10/22/2024    Narrative  EXAM: [facility] DANIELITO DIGITAL DIAGNOSTIC BILATERAL  ACC#: [id number]    INDICATION: Personal history of malignant neoplasm of breast, left lumpectomy  November 2023    COMPARISON: Prior mammograms from [id number]    BREAST COMPOSITION: The breasts are heterogeneously dense, which may obscure  small masses.      FINDINGS: Bilateral digital diagnostic mammography was performed, and is  interpreted in conjunction with a computer assisted detection (CAD) system.  Additionally, tomosynthesis of both breasts in the CC and MLO projections was  performed. Lumpectomy changes are noted in the left breast .  No new masses or  suspicious calcifications are identified. In the right breast there are no  suspicious masses or microcalcifications.    Impression  1.   BI-RADS Assessment Category 2: Benign finding.  Left lumpectomy scar is  benign.    Recommendation:  Bilateral diagnostic mammograms in one year.    The findings of this exam and the recommendation were directly discussed with  the patient at the time of exam by myself.      Electronically signed by TIFFANY BOCANEGRA    Performing Facility: [facility] [address] [place] [address] [address] [place] [address] Phone: [phone]        Review of Systems      Physical Exam  Chest:   Breasts:     Right: No swelling, mass, skin change or tenderness.      Left: No swelling, mass, skin change or tenderness.

## 2024-10-30 ENCOUNTER — CLINICAL DOCUMENTATION (OUTPATIENT)
Age: 54
End: 2024-10-30

## 2024-10-30 NOTE — PROGRESS NOTES
Patient left message that she was having trouble scheduling annual mammogram as they told her that the order expires 10/22/2025 and she would like to schedule for 10/23/2025 on the same day she sees Dr. Winston.     I called the [facility] and spoke with Gabriella who verified that the order does not  until 2025. She verified the patient should be able to call to schedule.     I returned the patients call and explained the details of the order for her and verified she had the correct number to schedule. Patient appreciative of call.

## 2024-11-19 RX ORDER — BUSPIRONE HYDROCHLORIDE 7.5 MG/1
7.5 TABLET ORAL 3 TIMES DAILY PRN
Qty: 90 TABLET | Refills: 2 | Status: SHIPPED | OUTPATIENT
Start: 2024-11-19

## 2024-12-14 RX ORDER — BUSPIRONE HYDROCHLORIDE 7.5 MG/1
7.5 TABLET ORAL 3 TIMES DAILY PRN
Qty: 270 TABLET | Refills: 1 | Status: SHIPPED | OUTPATIENT
Start: 2024-12-14

## 2025-02-05 ENCOUNTER — TELEPHONE (OUTPATIENT)
Age: 55
End: 2025-02-05

## 2025-02-05 NOTE — TELEPHONE ENCOUNTER
Called and spoke to patient and got her apt in 2/10 moved to another day went over the new day and time

## 2025-02-25 ENCOUNTER — OFFICE VISIT (OUTPATIENT)
Age: 55
End: 2025-02-25
Payer: COMMERCIAL

## 2025-02-25 ENCOUNTER — RESEARCH ENCOUNTER (OUTPATIENT)
Age: 55
End: 2025-02-25

## 2025-02-25 VITALS
BODY MASS INDEX: 23.39 KG/M2 | HEIGHT: 64 IN | TEMPERATURE: 97.7 F | WEIGHT: 137 LBS | HEART RATE: 65 BPM | DIASTOLIC BLOOD PRESSURE: 68 MMHG | OXYGEN SATURATION: 99 % | SYSTOLIC BLOOD PRESSURE: 107 MMHG | RESPIRATION RATE: 18 BRPM

## 2025-02-25 DIAGNOSIS — C50.412 MALIGNANT NEOPLASM OF UPPER-OUTER QUADRANT OF LEFT BREAST IN FEMALE, ESTROGEN RECEPTOR POSITIVE (HCC): Primary | ICD-10-CM

## 2025-02-25 DIAGNOSIS — Z17.0 MALIGNANT NEOPLASM OF UPPER-OUTER QUADRANT OF LEFT BREAST IN FEMALE, ESTROGEN RECEPTOR POSITIVE (HCC): Primary | ICD-10-CM

## 2025-02-25 PROCEDURE — 99214 OFFICE O/P EST MOD 30 MIN: CPT | Performed by: INTERNAL MEDICINE

## 2025-02-25 ASSESSMENT — PATIENT HEALTH QUESTIONNAIRE - PHQ9
2. FEELING DOWN, DEPRESSED OR HOPELESS: MORE THAN HALF THE DAYS
SUM OF ALL RESPONSES TO PHQ QUESTIONS 1-9: 2
1. LITTLE INTEREST OR PLEASURE IN DOING THINGS: NOT AT ALL
SUM OF ALL RESPONSES TO PHQ QUESTIONS 1-9: 2
SUM OF ALL RESPONSES TO PHQ9 QUESTIONS 1 & 2: 2

## 2025-02-25 NOTE — PROGRESS NOTES
Pt has been screened for all eligibility/ineligibility criteria and has been determined eligible for this protocol. Informed consent was reviewed by RN with patient prior to signing.  Patient was informed that participation is voluntary and she has the right to withdraw at anytime without prejudice.  The patient has been deemed of adequate mental and emotional capacity to give an informed consent per Dr. Jackson. All questions were answered adequately by RN or Dr. Jackson.   Patient signed consent today for study NRG-: \"Cognitive Training for Cancer Related Cognitive Impairment in Breast Cancer Survivors: A Multi-Center Randomized Double-Blinded Controlled Trial\".  The patient was accompanied by no one.  A copy of ICF given to patient.  No additional questions at this time.     Screening assessments completed per protocol- patient is eligible based on scores.

## 2025-02-25 NOTE — PROGRESS NOTES
Phuong Sharif is a 55 y.o. female is here today for a breast cancer follow up.    1. Have you been to the ER, urgent care clinic since your last visit?  Hospitalized since your last visit?No    2. Have you seen or consulted any other health care providers outside of the [facility] since your last visit?  Include any pap smears or colon screening. No    
Normal skin and nipple.      Left breast: Irregular arterially enhancing mass with spiculated margins in the  anterior third at the 2:00 position measures 2.0 x 1.7 x 2.1 cm. Estimated  volume is 2.8 mL. Biopsy clip is at the medial margin of the mass. Although  there is retraction of the skin adjacent to the nipple, there is no extension  into the nipple or skin.     Regional non mass enhancement in the middle and posterior third of the inferior  breast 5-7:00 measures in total approximately 3.2 x 4.4 x 4.0 cm. No extension  to the skin. These findings correspond to the suspicious microcalcifications on  mammography.     There are foci in the superior left breast.     Miscellaneous: None.     IMPRESSION:     1. 2.1 cm biopsy-proven carcinoma in the left breast 2:00 position. Biopsy clip  is at the medial margin.  2. 4.4 cm regional enhancement in the inferior left breast corresponds to the  suspicious microcalcifications on mammogram and most likely represents  multicentric disease.  3. No lymphadenopathy.  4. No MRI evidence of malignancy in the right breast.    9/11/24 dexa  Findings:     Femoral Neck Left: .  Bone mineral density (gm/cm2): 0.909 g/cm?.  % of peak bone mass: 88.0%.  % for age matched controls: 100.0%.  T-score: -0.9 .  Z-score: 0.0 .     Femoral Neck Right: .  Bone mineral density (gm/cm2): 0.957 g/cm?.  % of peak bone mass: 92.0%.  % for age matched controls:  105.0%.  T-score: -0.6 .  Z-score: 0.3 .     Total Hip Left: .  Bone mineral density (gm/cm2): 0.876 g/cm?.  % of peak bone mass: 87.0%.  % for age matched controls: 93.0%.  T-score: -1.0 .  Z-score: -0.5 .     Total Hip Right: .  Bone mineral density (gm/cm2): 0.914 g/cm?.  % of peak bone mass: 91.0%.  % for age matched controls:  97.0%.  T-score: -0.7 .  Z-score: -0.2 .     Lumbar Spine: L1-4 or specify.  Bone mineral density (gm/cm2): 1.023 g/cm?.  % of peak bone mass: 86.0%.  % for age matched controls: 91.0%.  T-score: -1.4

## 2025-03-17 RX ORDER — TAMOXIFEN CITRATE 20 MG/1
20 TABLET ORAL DAILY
Qty: 90 TABLET | Refills: 3 | Status: SHIPPED | OUTPATIENT
Start: 2025-03-17

## 2025-05-15 ENCOUNTER — OFFICE VISIT (OUTPATIENT)
Facility: CLINIC | Age: 55
End: 2025-05-15
Payer: COMMERCIAL

## 2025-05-15 ENCOUNTER — TELEPHONE (OUTPATIENT)
Facility: CLINIC | Age: 55
End: 2025-05-15

## 2025-05-15 VITALS
HEIGHT: 64 IN | DIASTOLIC BLOOD PRESSURE: 86 MMHG | TEMPERATURE: 97.7 F | RESPIRATION RATE: 16 BRPM | SYSTOLIC BLOOD PRESSURE: 121 MMHG | BODY MASS INDEX: 22.4 KG/M2 | OXYGEN SATURATION: 98 % | HEART RATE: 67 BPM | WEIGHT: 131.2 LBS

## 2025-05-15 DIAGNOSIS — H69.91 DYSFUNCTION OF RIGHT EUSTACHIAN TUBE: ICD-10-CM

## 2025-05-15 DIAGNOSIS — Z00.00 PREVENTATIVE HEALTH CARE: ICD-10-CM

## 2025-05-15 DIAGNOSIS — R20.2 HAND PARESTHESIA: ICD-10-CM

## 2025-05-15 DIAGNOSIS — C50.012 MALIGNANT NEOPLASM OF NIPPLE OF LEFT BREAST IN FEMALE, ESTROGEN RECEPTOR POSITIVE (HCC): ICD-10-CM

## 2025-05-15 DIAGNOSIS — R10.9 ABDOMINAL CRAMPS: ICD-10-CM

## 2025-05-15 DIAGNOSIS — M76.01 GLUTEAL TENDINITIS OF RIGHT BUTTOCK: ICD-10-CM

## 2025-05-15 DIAGNOSIS — Z00.00 PREVENTATIVE HEALTH CARE: Primary | ICD-10-CM

## 2025-05-15 DIAGNOSIS — F41.8 OTHER SPECIFIED ANXIETY DISORDERS: ICD-10-CM

## 2025-05-15 DIAGNOSIS — Z17.0 MALIGNANT NEOPLASM OF NIPPLE OF LEFT BREAST IN FEMALE, ESTROGEN RECEPTOR POSITIVE (HCC): ICD-10-CM

## 2025-05-15 LAB
ALBUMIN SERPL-MCNC: 4 G/DL (ref 3.5–5)
ALBUMIN/GLOB SERPL: 1.2 (ref 1.1–2.2)
ALP SERPL-CCNC: 75 U/L (ref 45–117)
ALT SERPL-CCNC: 28 U/L (ref 12–78)
ANION GAP SERPL CALC-SCNC: 3 MMOL/L (ref 2–12)
AST SERPL-CCNC: 24 U/L (ref 15–37)
BILIRUB SERPL-MCNC: 0.5 MG/DL (ref 0.2–1)
BUN SERPL-MCNC: 18 MG/DL (ref 6–20)
BUN/CREAT SERPL: 18 (ref 12–20)
CALCIUM SERPL-MCNC: 10 MG/DL (ref 8.5–10.1)
CHLORIDE SERPL-SCNC: 108 MMOL/L (ref 97–108)
CHOLEST SERPL-MCNC: 214 MG/DL
CO2 SERPL-SCNC: 29 MMOL/L (ref 21–32)
CREAT SERPL-MCNC: 1.01 MG/DL (ref 0.55–1.02)
ERYTHROCYTE [DISTWIDTH] IN BLOOD BY AUTOMATED COUNT: 11.9 % (ref 11.5–14.5)
GLOBULIN SER CALC-MCNC: 3.3 G/DL (ref 2–4)
GLUCOSE SERPL-MCNC: 102 MG/DL (ref 65–100)
HCT VFR BLD AUTO: 40.6 % (ref 35–47)
HDLC SERPL-MCNC: 81 MG/DL
HDLC SERPL: 2.6 (ref 0–5)
HGB BLD-MCNC: 13.6 G/DL (ref 11.5–16)
LDLC SERPL CALC-MCNC: 111.4 MG/DL (ref 0–100)
MCH RBC QN AUTO: 31.6 PG (ref 26–34)
MCHC RBC AUTO-ENTMCNC: 33.5 G/DL (ref 30–36.5)
MCV RBC AUTO: 94.4 FL (ref 80–99)
NRBC # BLD: 0 K/UL (ref 0–0.01)
NRBC BLD-RTO: 0 PER 100 WBC
PLATELET # BLD AUTO: 191 K/UL (ref 150–400)
PMV BLD AUTO: 12.4 FL (ref 8.9–12.9)
POTASSIUM SERPL-SCNC: 4.6 MMOL/L (ref 3.5–5.1)
PROT SERPL-MCNC: 7.3 G/DL (ref 6.4–8.2)
RBC # BLD AUTO: 4.3 M/UL (ref 3.8–5.2)
SODIUM SERPL-SCNC: 140 MMOL/L (ref 136–145)
TRIGL SERPL-MCNC: 108 MG/DL
VLDLC SERPL CALC-MCNC: 21.6 MG/DL
WBC # BLD AUTO: 6.4 K/UL (ref 3.6–11)

## 2025-05-15 PROCEDURE — 99396 PREV VISIT EST AGE 40-64: CPT | Performed by: INTERNAL MEDICINE

## 2025-05-15 RX ORDER — IBUPROFEN 200 MG
600 TABLET ORAL EVERY 6 HOURS PRN
Qty: 120 TABLET | Refills: 3
Start: 2025-05-15

## 2025-05-15 SDOH — ECONOMIC STABILITY: FOOD INSECURITY: WITHIN THE PAST 12 MONTHS, THE FOOD YOU BOUGHT JUST DIDN'T LAST AND YOU DIDN'T HAVE MONEY TO GET MORE.: NEVER TRUE

## 2025-05-15 SDOH — ECONOMIC STABILITY: FOOD INSECURITY: WITHIN THE PAST 12 MONTHS, YOU WORRIED THAT YOUR FOOD WOULD RUN OUT BEFORE YOU GOT MONEY TO BUY MORE.: NEVER TRUE

## 2025-05-15 ASSESSMENT — PATIENT HEALTH QUESTIONNAIRE - PHQ9
SUM OF ALL RESPONSES TO PHQ QUESTIONS 1-9: 0
2. FEELING DOWN, DEPRESSED OR HOPELESS: NOT AT ALL
SUM OF ALL RESPONSES TO PHQ QUESTIONS 1-9: 0
SUM OF ALL RESPONSES TO PHQ QUESTIONS 1-9: 0
1. LITTLE INTEREST OR PLEASURE IN DOING THINGS: NOT AT ALL
SUM OF ALL RESPONSES TO PHQ QUESTIONS 1-9: 0

## 2025-05-15 NOTE — TELEPHONE ENCOUNTER
----- Message from Dr. Lcaho Alfredo MD sent at 5/15/2025  9:26 AM EDT -----  Regarding: New patient  Janna,   Please call this patient to help schedule her son as a new patient.    Son is Timothy Sharif.

## 2025-05-16 NOTE — PROGRESS NOTES
Identified pt with two pt identifiers(name and ). Reviewed record in preparation for visit and have obtained necessary documentation. All patient medications has been reviewed.  Chief Complaint   Patient presents with    Annual Exam     *Immunizations updated*    Health Maintenance Due   Topic    Pneumococcal 50+ years Vaccine (1 of  - PCV)    COVID-19 Vaccine (3 - 2024-25 season)     Health Maintenance Review: Patient reminded of \"due or due soon\" health maintenance. I have asked the patient to contact his/her primary care provider (PCP) for follow-up on his/her health maintenance.    Wt Readings from Last 3 Encounters:   05/15/25 59.5 kg (131 lb 3.2 oz)   25 62.1 kg (137 lb)   10/22/24 58.1 kg (128 lb)     Temp Readings from Last 3 Encounters:   05/15/25 97.7 °F (36.5 °C) (Oral)   25 97.7 °F (36.5 °C) (Temporal)   24 97.8 °F (36.6 °C) (Temporal)     BP Readings from Last 3 Encounters:   05/15/25 121/86   25 107/68   24 127/81     Pulse Readings from Last 3 Encounters:   05/15/25 67   25 65   24 81       1. \"Have you been to the ER, urgent care clinic since your last visit?  Hospitalized since your last visit?\" No    2. \"Have you seen or consulted any other health care providers outside of the [facility] since your last visit?\"  Ob/Gyn      3. For patients aged 45-75: Has the patient had a colonoscopy / FIT/ Cologuard? Yes - Care Gap present. Most recent result on file    If the patient is female:    4. For patients aged 40-74: Has the patient had a mammogram within the past 2 years? Yes - Care Gap present. Most recent result on file    5. For patients aged 21-65: Has the patient had a pap smear? No - Hysterectomy    Patient is accompanied by self I have received verbal consent from Phuong Sharif to discuss any/all medical information while they are present in the room.   
  Social History     Socioeconomic History    Marital status:      Spouse name: Brandan    Number of children: 1    Years of education: Not on file    Highest education level: Not on file   Occupational History    Not on file   Tobacco Use    Smoking status: Never     Passive exposure: Never    Smokeless tobacco: Never   Vaping Use    Vaping status: Never Used   Substance and Sexual Activity    Alcohol use: Yes     Comment: very little    Drug use: Never    Sexual activity: Yes     Partners: Male     Comment:    Other Topics Concern    Not on file   Social History Narrative    1 daughter (senior) - going to [employer] fall 2020     Social Drivers of Health     Financial Resource Strain: Low Risk  (5/14/2024)    Overall Financial Resource Strain (CARDIA)     Difficulty of Paying Living Expenses: Not very hard   Food Insecurity: No Food Insecurity (5/15/2025)    Hunger Vital Sign     Worried About Running Out of Food in the Last Year: Never true     Ran Out of Food in the Last Year: Never true   Transportation Needs: No Transportation Needs (5/15/2025)    PRAPARE - Transportation     Lack of Transportation (Medical): No     Lack of Transportation (Non-Medical): No   Physical Activity: Sufficiently Active (3/31/2023)    Exercise Vital Sign     Days of Exercise per Week: 4 days     Minutes of Exercise per Session: 40 min   Stress: Stress Concern Present (4/1/2023)    [employer] [place] [employer] - Occupational Stress Questionnaire     Feeling of Stress : Rather much   Social Connections: Moderately Integrated (3/31/2023)    Social Connection and Isolation Panel [NHANES]     Frequency of Communication with Friends and Family: Three times a week     Frequency of Social Gatherings with Friends and Family: Three times a week     Attends Gnosticism Services: More than 4 times per year     Active Member of Clubs or Organizations: No     Attends Club or Organization Meetings: Never     Marital Status:

## 2025-05-18 ENCOUNTER — RESULTS FOLLOW-UP (OUTPATIENT)
Facility: CLINIC | Age: 55
End: 2025-05-18

## 2025-07-28 ENCOUNTER — TELEPHONE (OUTPATIENT)
Age: 55
End: 2025-07-28

## 2025-07-28 NOTE — TELEPHONE ENCOUNTER
Patient call needs to move apt to a sooner time due to having surgery.patient requesting a call back.  Thank you    # [phone]

## (undated) DEVICE — RELOAD STPL L45MM H1.5-3.6MM REG TISS BLU GRIPPING SURF B

## (undated) DEVICE — DERMABOND SKIN ADH 0.7ML -- DERMABOND ADVANCED 12/BX

## (undated) DEVICE — SLEEVE TRCR L100MM DIA5MM UNIV STBL FOR BLDELSS DIL TIP

## (undated) DEVICE — SOLUTION IRRIG 1000ML STRL H2O USP PLAS POUR BTL

## (undated) DEVICE — INFECTION CONTROL KIT SYS

## (undated) DEVICE — Device

## (undated) DEVICE — SUTURE ETHLN SZ 3-0 L18IN NONABSORBABLE BLK L24MM PS-1 3/8 1663G

## (undated) DEVICE — CANISTER, RIGID, 3000CC: Brand: MEDLINE INDUSTRIES, INC.

## (undated) DEVICE — RESERVOIR,SUCTION,100CC,SILICONE: Brand: MEDLINE

## (undated) DEVICE — COVER US PRB W15XL120CM W/ GEL RUBBERBAND TAPE STRP FLD GEN

## (undated) DEVICE — SURGICAL PROCEDURE KIT GEN LAPAROSCOPY LF

## (undated) DEVICE — GLOVE SURG SZ 65 THK91MIL LTX FREE SYN POLYISOPRENE

## (undated) DEVICE — BAG SPEC REM 224ML W4XL6IN DIA10MM 1 HND GYN DISP ENDOPCH

## (undated) DEVICE — AEGIS 1" DISK 4MM HOLE, PEEL OPEN: Brand: MEDLINE

## (undated) DEVICE — DRAIN SURG 19FR 100% SIL RADPQ RND CHN FULL FLUT

## (undated) DEVICE — SUTURE SZ 0 27IN 5/8 CIR UR-6  TAPER PT VIOLET ABSRB VICRYL J603H

## (undated) DEVICE — DRAPE,REIN 53X77,STERILE: Brand: MEDLINE

## (undated) DEVICE — SUTURE PERMA-HAND SZ 2-0 L30IN NONABSORBABLE BLK L26MM SH K833H

## (undated) DEVICE — BLUNTFILL WITH FILTER: Brand: MONOJECT

## (undated) DEVICE — DRAIN SURG W10XL20CM SIL SMOOTH FLAT 3/4 PERF DBL WRP

## (undated) DEVICE — TROCAR ENDOSCP L100MM DIA12MM STBL SL BLDELSS ENDOPATH XCEL

## (undated) DEVICE — CHEST PACK-SFMCASU: Brand: MEDLINE INDUSTRIES, INC.

## (undated) DEVICE — HYPODERMIC SAFETY NEEDLE: Brand: MONOJECT

## (undated) DEVICE — (D)PREP SKN CHLRAPRP APPL 26ML -- CONVERT TO ITEM 371833

## (undated) DEVICE — PENCIL ES CRD L10FT HND SWCHING ROCK SWCH W/ EDGE COAT BLDE

## (undated) DEVICE — STRIP,CLOSURE,WOUND,MEDI-STRIP,1/2X4: Brand: MEDLINE

## (undated) DEVICE — SOL IRRIGATION INJ NACL 0.9% 500ML BTL

## (undated) DEVICE — RELOAD STPL L45MM H1-2.6MM MESENTERY THN TISS WHT GRIPPING

## (undated) DEVICE — STRAP,POSITIONING,KNEE/BODY,FOAM,4X60": Brand: MEDLINE

## (undated) DEVICE — STERILE POLYISOPRENE POWDER-FREE SURGICAL GLOVES: Brand: PROTEXIS

## (undated) DEVICE — INSUFFLATION NEEDLE TO ESTABLISH PNEUMOPERITONEUM.: Brand: INSUFFLATION NEEDLE

## (undated) DEVICE — REM POLYHESIVE ADULT PATIENT RETURN ELECTRODE: Brand: VALLEYLAB

## (undated) DEVICE — TROCAR ENDOSCP L100MM DIA5MM BLDELSS STBL SL THRD OPT VW

## (undated) DEVICE — NEEDLE HYPO 22GA L1.5IN BLK S STL HUB POLYPR SHLD REG BVL

## (undated) DEVICE — TUBING INSUF 0.3UM FLTR W/ LUERLOCK CONN

## (undated) DEVICE — 3M™ TEGADERM™ TRANSPARENT FILM DRESSING FRAME STYLE, 1626W, 4 IN X 4-3/4 IN (10 CM X 12 CM), 50/CT 4CT/CASE: Brand: 3M™ TEGADERM™

## (undated) DEVICE — SUTURE MCRYL SZ 4-0 L27IN ABSRB UD L19MM PS-2 1/2 CIR PRIM Y426H

## (undated) DEVICE — SOLUTION IRRIG 500ML 0.9% SOD CHLO USP POUR PLAS BTL

## (undated) DEVICE — SYRINGE FLSH IV STD 10 CC W/O CANN PREFILLED NACL POSIFLUSH 306546

## (undated) DEVICE — DEVICE TRNSF SPIK STL 2008S] MICROTEK MEDICAL INC]

## (undated) DEVICE — RELOAD STPL SZ 0 L45MM DIA3.5MM 0DEG STD REG TISS BLU TI

## (undated) DEVICE — SCISSORS ENDOSCP DIA5MM CRV MPLR CAUT W/ RATCH HNDL

## (undated) DEVICE — STAPLER INT L340MM 45MM STD 12 FIRING B FRM PWR + GRIPPING